# Patient Record
Sex: FEMALE | Race: BLACK OR AFRICAN AMERICAN | Employment: UNEMPLOYED | URBAN - METROPOLITAN AREA
[De-identification: names, ages, dates, MRNs, and addresses within clinical notes are randomized per-mention and may not be internally consistent; named-entity substitution may affect disease eponyms.]

---

## 2017-02-20 ENCOUNTER — HOSPITAL ENCOUNTER (EMERGENCY)
Age: 22
Discharge: HOME | End: 2017-02-20
Attending: EMERGENCY MEDICINE
Payer: MEDICARE

## 2017-02-20 VITALS
DIASTOLIC BLOOD PRESSURE: 95 MMHG | SYSTOLIC BLOOD PRESSURE: 135 MMHG | TEMPERATURE: 98.6 F | RESPIRATION RATE: 16 BRPM | HEART RATE: 91 BPM | OXYGEN SATURATION: 99 %

## 2017-02-20 DIAGNOSIS — N61.1 BREAST ABSCESS: Primary | ICD-10-CM

## 2017-02-20 PROCEDURE — G0381 LEV 2 HOSP TYPE B ED VISIT: HCPCS

## 2017-02-20 PROCEDURE — 99282 EMERGENCY DEPT VISIT SF MDM: CPT

## 2017-02-20 RX ORDER — CEPHALEXIN 250 MG/1
500 CAPSULE ORAL ONCE
Status: DISCONTINUED | OUTPATIENT
Start: 2017-02-20 | End: 2017-02-20 | Stop reason: HOSPADM

## 2017-02-20 RX ORDER — IBUPROFEN 800 MG/1
800 TABLET ORAL EVERY 8 HOURS PRN
Qty: 30 TABLET | Refills: 0 | Status: ON HOLD | OUTPATIENT
Start: 2017-02-20 | End: 2018-05-01 | Stop reason: HOSPADM

## 2017-02-20 RX ORDER — SULFAMETHOXAZOLE AND TRIMETHOPRIM 800; 160 MG/1; MG/1
1 TABLET ORAL 2 TIMES DAILY
Qty: 19 TABLET | Refills: 0 | Status: SHIPPED | OUTPATIENT
Start: 2017-02-20 | End: 2017-03-02

## 2017-02-20 RX ORDER — CEPHALEXIN 500 MG/1
500 CAPSULE ORAL 3 TIMES DAILY
Qty: 30 CAPSULE | Refills: 0 | Status: SHIPPED | OUTPATIENT
Start: 2017-02-20 | End: 2017-03-02

## 2017-02-20 RX ORDER — SULFAMETHOXAZOLE AND TRIMETHOPRIM 800; 160 MG/1; MG/1
1 TABLET ORAL ONCE
Status: DISCONTINUED | OUTPATIENT
Start: 2017-02-20 | End: 2017-02-20 | Stop reason: HOSPADM

## 2017-02-20 ASSESSMENT — PAIN DESCRIPTION - PAIN TYPE: TYPE: ACUTE PAIN

## 2017-02-20 ASSESSMENT — PAIN DESCRIPTION - LOCATION: LOCATION: CHEST

## 2017-02-20 ASSESSMENT — PAIN SCALES - GENERAL: PAINLEVEL_OUTOF10: 7

## 2017-02-20 ASSESSMENT — PAIN DESCRIPTION - ORIENTATION: ORIENTATION: MID

## 2018-05-01 ENCOUNTER — HOSPITAL ENCOUNTER (OUTPATIENT)
Age: 23
Discharge: HOME | End: 2018-05-01
Attending: OBSTETRICS & GYNECOLOGY | Admitting: OBSTETRICS & GYNECOLOGY
Payer: MEDICAID

## 2018-05-01 VITALS
TEMPERATURE: 97.9 F | HEART RATE: 98 BPM | DIASTOLIC BLOOD PRESSURE: 67 MMHG | SYSTOLIC BLOOD PRESSURE: 107 MMHG | RESPIRATION RATE: 16 BRPM

## 2018-05-01 DIAGNOSIS — Z33.1 IUP (INTRAUTERINE PREGNANCY), INCIDENTAL: Primary | ICD-10-CM

## 2018-05-01 LAB
-: ABNORMAL
AMORPHOUS: ABNORMAL
BACTERIA: ABNORMAL
BILIRUBIN URINE: NEGATIVE
C TRACH DNA GENITAL QL NAA+PROBE: NEGATIVE
CASTS UA: ABNORMAL /LPF (ref 0–8)
COLOR: YELLOW
COMMENT UA: ABNORMAL
CRYSTALS, UA: ABNORMAL /HPF
EPITHELIAL CELLS UA: ABNORMAL /HPF (ref 0–5)
GLUCOSE URINE: NEGATIVE
KETONES, URINE: NEGATIVE
LEUKOCYTE ESTERASE, URINE: NEGATIVE
MUCUS: ABNORMAL
N. GONORRHOEAE DNA: NEGATIVE
NITRITE, URINE: NEGATIVE
OTHER OBSERVATIONS UA: ABNORMAL
PH UA: 6 (ref 5–8)
PROTEIN UA: NEGATIVE
RBC UA: ABNORMAL /HPF (ref 0–4)
RENAL EPITHELIAL, UA: ABNORMAL /HPF
SPECIFIC GRAVITY UA: 1.02 (ref 1–1.03)
TRICHOMONAS: ABNORMAL
TURBIDITY: ABNORMAL
URINE HGB: NEGATIVE
UROBILINOGEN, URINE: NORMAL
WBC UA: ABNORMAL /HPF (ref 0–5)
YEAST: ABNORMAL

## 2018-05-01 PROCEDURE — 6370000000 HC RX 637 (ALT 250 FOR IP): Performed by: STUDENT IN AN ORGANIZED HEALTH CARE EDUCATION/TRAINING PROGRAM

## 2018-05-01 PROCEDURE — 99235 HOSP IP/OBS SAME DATE MOD 70: CPT | Performed by: OBSTETRICS & GYNECOLOGY

## 2018-05-01 PROCEDURE — 87591 N.GONORRHOEAE DNA AMP PROB: CPT

## 2018-05-01 PROCEDURE — 87480 CANDIDA DNA DIR PROBE: CPT

## 2018-05-01 PROCEDURE — 87086 URINE CULTURE/COLONY COUNT: CPT

## 2018-05-01 PROCEDURE — 87491 CHLMYD TRACH DNA AMP PROBE: CPT

## 2018-05-01 PROCEDURE — 81001 URINALYSIS AUTO W/SCOPE: CPT

## 2018-05-01 PROCEDURE — 99213 OFFICE O/P EST LOW 20 MIN: CPT

## 2018-05-01 PROCEDURE — 87510 GARDNER VAG DNA DIR PROBE: CPT

## 2018-05-01 PROCEDURE — 87660 TRICHOMONAS VAGIN DIR PROBE: CPT

## 2018-05-01 RX ORDER — SODIUM CHLORIDE 0.9 % (FLUSH) 0.9 %
10 SYRINGE (ML) INJECTION EVERY 12 HOURS SCHEDULED
Status: DISCONTINUED | OUTPATIENT
Start: 2018-05-01 | End: 2018-05-01 | Stop reason: HOSPADM

## 2018-05-01 RX ORDER — SODIUM CHLORIDE 0.9 % (FLUSH) 0.9 %
10 SYRINGE (ML) INJECTION PRN
Status: DISCONTINUED | OUTPATIENT
Start: 2018-05-01 | End: 2018-05-01 | Stop reason: HOSPADM

## 2018-05-01 RX ORDER — CALCIUM CARBONATE 200(500)MG
1000 TABLET,CHEWABLE ORAL 3 TIMES DAILY PRN
Qty: 60 TABLET | Refills: 1 | Status: SHIPPED | OUTPATIENT
Start: 2018-05-01 | End: 2018-05-31

## 2018-05-01 RX ORDER — CALCIUM CARBONATE 200(500)MG
1000 TABLET,CHEWABLE ORAL 3 TIMES DAILY PRN
Status: DISCONTINUED | OUTPATIENT
Start: 2018-05-01 | End: 2018-05-01 | Stop reason: HOSPADM

## 2018-05-01 RX ORDER — ACETAMINOPHEN 500 MG
1000 TABLET ORAL EVERY 6 HOURS PRN
Status: DISCONTINUED | OUTPATIENT
Start: 2018-05-01 | End: 2018-05-01 | Stop reason: HOSPADM

## 2018-05-01 RX ADMIN — ANTACID TABLETS 1000 MG: 500 TABLET, CHEWABLE ORAL at 06:16

## 2018-05-01 RX ADMIN — ACETAMINOPHEN 1000 MG: 500 TABLET ORAL at 06:18

## 2018-05-01 ASSESSMENT — PAIN SCALES - GENERAL: PAINLEVEL_OUTOF10: 8

## 2018-05-02 LAB
CULTURE: NORMAL
CULTURE: NORMAL
Lab: NORMAL
SPECIMEN DESCRIPTION: NORMAL
STATUS: NORMAL

## 2018-05-03 LAB
DIRECT EXAM: NORMAL
DIRECT EXAM: NORMAL
Lab: NORMAL
SPECIMEN DESCRIPTION: NORMAL
STATUS: NORMAL

## 2018-05-21 ENCOUNTER — HOSPITAL ENCOUNTER (OUTPATIENT)
Age: 23
Setting detail: SPECIMEN
Discharge: HOME | End: 2018-05-21
Payer: MEDICAID

## 2018-07-10 ENCOUNTER — HOSPITAL ENCOUNTER (OUTPATIENT)
Age: 23
Discharge: HOME | End: 2018-07-10
Attending: OBSTETRICS & GYNECOLOGY | Admitting: OBSTETRICS & GYNECOLOGY
Payer: MEDICARE

## 2018-07-10 VITALS
SYSTOLIC BLOOD PRESSURE: 132 MMHG | DIASTOLIC BLOOD PRESSURE: 71 MMHG | RESPIRATION RATE: 18 BRPM | HEART RATE: 81 BPM | TEMPERATURE: 98 F

## 2018-07-10 PROBLEM — O09.30 LATE PRENATAL CARE: Status: ACTIVE | Noted: 2018-07-10

## 2018-07-10 PROBLEM — O09.93 HRP (HIGH RISK PREGNANCY), THIRD TRIMESTER: Status: ACTIVE | Noted: 2018-07-10

## 2018-07-10 PROBLEM — O99.119 GESTATIONAL THROMBOCYTOPENIA (HCC): Status: ACTIVE | Noted: 2018-07-10

## 2018-07-10 PROBLEM — O99.019 ANEMIA AFFECTING PREGNANCY: Status: ACTIVE | Noted: 2018-07-10

## 2018-07-10 PROBLEM — E66.9 OBESITY: Status: ACTIVE | Noted: 2018-07-10

## 2018-07-10 PROBLEM — Z72.0 TOBACCO USE: Status: ACTIVE | Noted: 2018-07-10

## 2018-07-10 PROBLEM — N76.0 BACTERIAL VAGINOSIS: Status: ACTIVE | Noted: 2018-07-10

## 2018-07-10 PROBLEM — Z86.32 H/O GESTATIONAL DIABETES IN PRIOR PREGNANCY, CURRENTLY PREGNANT: Status: ACTIVE | Noted: 2018-07-10

## 2018-07-10 PROBLEM — O24.419 DM (DIABETES MELLITUS), GESTATIONAL: Status: ACTIVE | Noted: 2018-07-10

## 2018-07-10 PROBLEM — D69.6 GESTATIONAL THROMBOCYTOPENIA (HCC): Status: ACTIVE | Noted: 2018-07-10

## 2018-07-10 PROBLEM — F41.9 ANXIETY: Status: ACTIVE | Noted: 2018-07-10

## 2018-07-10 PROBLEM — B96.89 BACTERIAL VAGINOSIS: Status: ACTIVE | Noted: 2018-07-10

## 2018-07-10 PROBLEM — O09.299 H/O GESTATIONAL DIABETES IN PRIOR PREGNANCY, CURRENTLY PREGNANT: Status: ACTIVE | Noted: 2018-07-10

## 2018-07-10 PROBLEM — F12.10 TETRAHYDROCANNABINOL (THC) USE DISORDER, MILD, ABUSE: Status: ACTIVE | Noted: 2018-07-10

## 2018-07-10 PROBLEM — Z33.1 IUP (INTRAUTERINE PREGNANCY), INCIDENTAL: Status: RESOLVED | Noted: 2018-05-01 | Resolved: 2018-07-10

## 2018-07-10 LAB
DIRECT EXAM: ABNORMAL
GLUCOSE BLD-MCNC: 91 MG/DL (ref 65–105)
Lab: ABNORMAL
SPECIMEN DESCRIPTION: ABNORMAL
STATUS: ABNORMAL

## 2018-07-10 PROCEDURE — 87591 N.GONORRHOEAE DNA AMP PROB: CPT

## 2018-07-10 PROCEDURE — 82947 ASSAY GLUCOSE BLOOD QUANT: CPT

## 2018-07-10 PROCEDURE — 87510 GARDNER VAG DNA DIR PROBE: CPT

## 2018-07-10 PROCEDURE — 99213 OFFICE O/P EST LOW 20 MIN: CPT

## 2018-07-10 PROCEDURE — 87491 CHLMYD TRACH DNA AMP PROBE: CPT

## 2018-07-10 PROCEDURE — 87660 TRICHOMONAS VAGIN DIR PROBE: CPT

## 2018-07-10 PROCEDURE — 87480 CANDIDA DNA DIR PROBE: CPT

## 2018-07-10 PROCEDURE — 99234 HOSP IP/OBS SM DT SF/LOW 45: CPT | Performed by: OBSTETRICS & GYNECOLOGY

## 2018-07-10 RX ORDER — ACETAMINOPHEN 500 MG
1000 TABLET ORAL EVERY 6 HOURS PRN
Status: DISCONTINUED | OUTPATIENT
Start: 2018-07-10 | End: 2018-07-10 | Stop reason: HOSPADM

## 2018-07-10 RX ORDER — METRONIDAZOLE 500 MG/1
500 TABLET ORAL 2 TIMES DAILY
Qty: 14 TABLET | Refills: 0 | Status: SHIPPED | OUTPATIENT
Start: 2018-07-10 | End: 2018-07-17

## 2018-07-10 RX ORDER — ACETAMINOPHEN 500 MG
1000 TABLET ORAL EVERY 6 HOURS PRN
Status: DISCONTINUED | OUTPATIENT
Start: 2018-07-10 | End: 2018-07-10

## 2018-07-10 NOTE — H&P
88 81   Resp: 17 18   Temp: 98 °F (36.7 °C)        PHYSICAL EXAM:  Fetal Heart Monitor:  Baseline Heart Rate 150, moderate variability, present accelerations, absent decelerations  [place]: contractions, uterine irritability     General appearance:  no apparent distress, alert and cooperative  Neurologic:  alert, oriented, normal speech, no focal findings or movement disorder noted  Lungs:  No increased work of breathing, good air exchange, clear to auscultation bilaterally, no crackles or wheezing  Heart:  regular rate and rhythm and no murmur    Abdomen:  soft, gravid, non-tender, no right upper quadrant tenderness, no CVA tenderness, uterus non-tender, no signs of abruption and no signs of chorioamnionitis  Extremities:  no calf tenderness, non edematous, DTRs: normal    Pelvic Exam:   -Speculum: Normal appearing external genitalia, vulva and vagina without edema, erythema or masses, no  gross blood, cervix visually closed with thickness, white discharge in vault    Cervix Check: 3 cm dilated, 50 % effaced, -2 out of 3 station, mid position, soft consistency, Cephalic    PRENATAL LAB RESULTS:   Blood Type/Rh: O pos  Antibody Screen: negative  Hemoglobin, Hematocrit, Platelets: 9.5 / 37.9 / 141  Rubella: immune  T.  Pallidum, IgG: non-reactive   Hepatitis B Surface Antigen: negative   HIV: non-reactive   Sickle Cell Screen: not available  Gonorrhea: negative  Chlamydia: negative  Urine culture: negative, date: 18    3 hour Glucose Tolerance Test: Fastin; 1 hour: 171; 2 hour  166; 3 hour: 121    Group B Strep: not done  Cystic Fibrosis Screen: not available  First Trimester Screen: not available  MSAFP/Multiple Markers: not available  Non-Invasive Prenatal Testing: not available  Anatomy US: posterior, 3VC, male with no anomalies     ASSESSMENT & PLAN:  Kerry Chan is a 20 y.o. female [id number] at 31w5d IUP   - GBS unknown / Rh positive / R immune   - No indication for GBS prophylaxis unless delivery is answered.     Attending's Name: Dr. Chandler Negro DO  Ob/Gyn Resident  7/10/2018, 1:47 PM

## 2018-07-10 NOTE — FLOWSHEET NOTE
Arrives to unit per squad states I have been joe since 0200, bow intact, no bloody show, baby active, efm applied, fht's 140, abd soft

## 2018-07-11 LAB
C TRACH DNA GENITAL QL NAA+PROBE: NEGATIVE
N. GONORRHOEAE DNA: NEGATIVE

## 2018-09-05 ENCOUNTER — HOSPITAL ENCOUNTER (EMERGENCY)
Age: 23
Discharge: HOME | End: 2018-09-05
Attending: EMERGENCY MEDICINE
Payer: MEDICARE

## 2018-09-05 VITALS
HEART RATE: 95 BPM | HEIGHT: 67 IN | BODY MASS INDEX: 37.67 KG/M2 | SYSTOLIC BLOOD PRESSURE: 134 MMHG | OXYGEN SATURATION: 100 % | RESPIRATION RATE: 18 BRPM | WEIGHT: 240 LBS | TEMPERATURE: 97.9 F | DIASTOLIC BLOOD PRESSURE: 80 MMHG

## 2018-09-05 DIAGNOSIS — N93.9 VAGINAL BLEEDING: Primary | ICD-10-CM

## 2018-09-05 LAB
-: NORMAL
DIRECT EXAM: ABNORMAL
HCG QUALITATIVE: NEGATIVE
HCT VFR BLD CALC: 38.6 % (ref 36.3–47.1)
HEMOGLOBIN: 12.1 G/DL (ref 11.9–15.1)
Lab: ABNORMAL
MCH RBC QN AUTO: 27.7 PG (ref 25.2–33.5)
MCHC RBC AUTO-ENTMCNC: 31.3 G/DL (ref 28.4–34.8)
MCV RBC AUTO: 88.3 FL (ref 82.6–102.9)
NRBC AUTOMATED: 0 PER 100 WBC
PDW BLD-RTO: 15.7 % (ref 11.8–14.4)
PLATELET # BLD: 187 K/UL (ref 138–453)
PMV BLD AUTO: 13 FL (ref 8.1–13.5)
RBC # BLD: 4.37 M/UL (ref 3.95–5.11)
REASON FOR REJECTION: NORMAL
SPECIMEN DESCRIPTION: ABNORMAL
STATUS: ABNORMAL
WBC # BLD: 7.1 K/UL (ref 3.5–11.3)
ZZ NTE CLEAN UP: ORDERED TEST: NORMAL
ZZ NTE WITH NAME CLEAN UP: SPECIMEN SOURCE: NORMAL

## 2018-09-05 PROCEDURE — 87660 TRICHOMONAS VAGIN DIR PROBE: CPT

## 2018-09-05 PROCEDURE — 85027 COMPLETE CBC AUTOMATED: CPT

## 2018-09-05 PROCEDURE — 99284 EMERGENCY DEPT VISIT MOD MDM: CPT

## 2018-09-05 PROCEDURE — 87480 CANDIDA DNA DIR PROBE: CPT

## 2018-09-05 PROCEDURE — 87510 GARDNER VAG DNA DIR PROBE: CPT

## 2018-09-05 PROCEDURE — 84703 CHORIONIC GONADOTROPIN ASSAY: CPT

## 2018-09-05 ASSESSMENT — PAIN DESCRIPTION - ONSET: ONSET: PROGRESSIVE

## 2018-09-05 ASSESSMENT — ENCOUNTER SYMPTOMS
NAUSEA: 0
SHORTNESS OF BREATH: 0
VOMITING: 0
COUGH: 0
DIARRHEA: 0
ABDOMINAL DISTENTION: 0
CONSTIPATION: 0
WHEEZING: 0
SORE THROAT: 0
RHINORRHEA: 0

## 2018-09-05 ASSESSMENT — PAIN DESCRIPTION - FREQUENCY: FREQUENCY: CONTINUOUS

## 2018-09-05 ASSESSMENT — PAIN DESCRIPTION - ORIENTATION: ORIENTATION: LOWER

## 2018-09-05 ASSESSMENT — PAIN DESCRIPTION - PROGRESSION: CLINICAL_PROGRESSION: GRADUALLY WORSENING

## 2018-09-05 ASSESSMENT — PAIN DESCRIPTION - PAIN TYPE: TYPE: ACUTE PAIN

## 2018-09-05 ASSESSMENT — PAIN DESCRIPTION - LOCATION: LOCATION: ABDOMEN

## 2018-09-05 ASSESSMENT — PAIN DESCRIPTION - DESCRIPTORS: DESCRIPTORS: CRAMPING

## 2018-09-05 ASSESSMENT — PAIN SCALES - GENERAL: PAINLEVEL_OUTOF10: 5

## 2018-09-05 NOTE — ED PROVIDER NOTES
[facility]  ED  Emergency Department        Pt Name: Jayden Bryan  MRN: [id number]  [place] 1995  Date of evaluation: 9/5/18    CHIEF COMPLAINT       Chief Complaint   Patient presents with    Vaginal Bleeding       HISTORY OF PRESENT ILLNESS  (Location/Symptom, Timing/Onset, Context/Setting, Quality, Duration, Modifying Factors, Severity.)      Jayden Bryan is a 20 y.o. female who presents with Heavy vaginal bleeding that started yesterday. Patient is approximately 4 weeks postpartum. She's had 6 total 5 births. She was induced at 37 weeks due to pregnancy-induced hypertension, she's been compliant with her 60 mg Procardia twice a day. She denies any abdominal pain. Does state that she had some bleeding after her delivery, which stopped, and then started for the first time yesterday. She has not inserted anything in her vagina, no sexual intercourse. Does state that yesterday she went through 5. Pads in 8 hours while at work. Patient without nausea or vomiting, she is not breast-feeding. No diarrhea or constipation, no dysuria or hematuria. She is not on any anticoagulation. PAST MEDICAL / SURGICAL / SOCIAL / FAMILY HISTORY      has a past medical history of Anemia; Gestational diabetes; and Hypertension. has no past surgical history on file. Social History     Social History    Marital status: Single     Spouse name: N/A    Number of children: N/A    Years of education: N/A     Occupational History    Not on file. Social History Main Topics    Smoking status: Current Every Day Smoker     Packs/day: 0.50     Years: 10.00    Smokeless tobacco: Never Used      Comment: i can quit when i want to    Alcohol use No    Drug use: No    Sexual activity: Yes     Partners: Male     Other Topics Concern    Not on file     Social History Narrative    No narrative on file       History reviewed. No pertinent family history.     Allergies:  Patient has no known allergies. Home Medications:  Prior to Admission medications    Not on File       REVIEW OF SYSTEMS    (2-9 systems for level 4, 10 or more for level 5)      Review of Systems   Constitutional: Negative for activity change, appetite change, fatigue and fever. HENT: Negative for congestion, rhinorrhea and sore throat. Respiratory: Negative for cough, shortness of breath and wheezing. Cardiovascular: Negative for chest pain, palpitations and leg swelling. Gastrointestinal: Negative for abdominal distention, constipation, diarrhea, nausea and vomiting. Genitourinary: Positive for decreased urine volume. Negative for dysuria. Skin: Negative for rash. Neurological: Negative for dizziness, weakness, light-headedness, numbness and headaches. PHYSICAL EXAM   (up to 7 for level 4, 8 or more for level 5)      INITIAL VITALS:   BP (!) 154/94   Pulse 95   Temp 97.9 °F (36.6 °C) (Oral)   Resp 18   Ht 5' 7\" (1.702 m)   Wt 240 lb (108.9 kg)   LMP 11/30/2017   SpO2 99%   BMI 37.59 kg/m²     Physical Exam   Constitutional: She is oriented to person, place, and time. Nontoxic appearing, answering all questions appropriately no signs of distress   HENT:   Head: Normocephalic and atraumatic. Eyes: Conjunctivae are normal. Right eye exhibits no discharge. Left eye exhibits no discharge. Cardiovascular: Normal rate, regular rhythm and normal heart sounds. Exam reveals no gallop and no friction rub. No murmur heard. Pulmonary/Chest: Effort normal and breath sounds normal. No respiratory distress. She has no wheezes. She has no rales. She exhibits no tenderness. Abdominal: Soft. She exhibits no distension and no mass. There is no tenderness. There is no rebound and no guarding.    Genitourinary:   Genitourinary Comments: Exam performed with Joellen Davila RN present in room, Multiparous cervical os, no CMT, blood noted in the vaginal vault, and bleeding from cervical os, no adnexal ttp an appointment as soon as possible for a visit in 2 days        DISCHARGE MEDICATIONS:  New Prescriptions    No medications on file       Jeane Gram  12:16 PM    Attending Emergency Physician  [facility] ED    (Please note that portions of this note were completed with a voice recognition program.  Efforts were made to edit the dictations but occasionally words are mis-transcribed.)              Aiyana Muir DO  09/05/18 2461

## 2019-05-29 ENCOUNTER — HOSPITAL ENCOUNTER (EMERGENCY)
Age: 24
Discharge: HOME | End: 2019-05-30
Attending: EMERGENCY MEDICINE
Payer: MEDICARE

## 2019-05-29 ENCOUNTER — APPOINTMENT (OUTPATIENT)
Dept: CT IMAGING | Age: 24
End: 2019-05-29
Payer: MEDICARE

## 2019-05-29 VITALS
DIASTOLIC BLOOD PRESSURE: 61 MMHG | TEMPERATURE: 98.1 F | RESPIRATION RATE: 16 BRPM | HEIGHT: 64 IN | BODY MASS INDEX: 40.63 KG/M2 | SYSTOLIC BLOOD PRESSURE: 137 MMHG | OXYGEN SATURATION: 100 % | WEIGHT: 238 LBS | HEART RATE: 94 BPM

## 2019-05-29 DIAGNOSIS — R55 SYNCOPE AND COLLAPSE: ICD-10-CM

## 2019-05-29 DIAGNOSIS — R42 DIZZINESS: Primary | ICD-10-CM

## 2019-05-29 PROCEDURE — 80053 COMPREHEN METABOLIC PANEL: CPT

## 2019-05-29 PROCEDURE — 93005 ELECTROCARDIOGRAM TRACING: CPT

## 2019-05-29 PROCEDURE — 84484 ASSAY OF TROPONIN QUANT: CPT

## 2019-05-29 PROCEDURE — 85025 COMPLETE CBC W/AUTO DIFF WBC: CPT

## 2019-05-29 PROCEDURE — 86900 BLOOD TYPING SEROLOGIC ABO: CPT

## 2019-05-29 PROCEDURE — 99284 EMERGENCY DEPT VISIT MOD MDM: CPT

## 2019-05-29 PROCEDURE — 72125 CT NECK SPINE W/O DYE: CPT

## 2019-05-29 PROCEDURE — 70450 CT HEAD/BRAIN W/O DYE: CPT

## 2019-05-29 PROCEDURE — 85055 RETICULATED PLATELET ASSAY: CPT

## 2019-05-29 PROCEDURE — 93005 ELECTROCARDIOGRAM TRACING: CPT | Performed by: EMERGENCY MEDICINE

## 2019-05-29 PROCEDURE — 85379 FIBRIN DEGRADATION QUANT: CPT

## 2019-05-29 PROCEDURE — 86901 BLOOD TYPING SEROLOGIC RH(D): CPT

## 2019-05-29 ASSESSMENT — PAIN DESCRIPTION - PAIN TYPE: TYPE: ACUTE PAIN

## 2019-05-29 ASSESSMENT — PAIN SCALES - GENERAL: PAINLEVEL_OUTOF10: 10

## 2019-05-29 ASSESSMENT — PAIN DESCRIPTION - ORIENTATION: ORIENTATION: POSTERIOR

## 2019-05-29 ASSESSMENT — PAIN DESCRIPTION - LOCATION: LOCATION: BACK;NECK

## 2019-05-30 ENCOUNTER — APPOINTMENT (OUTPATIENT)
Dept: CT IMAGING | Age: 24
End: 2019-05-30
Payer: MEDICARE

## 2019-05-30 ENCOUNTER — HOSPITAL ENCOUNTER (OUTPATIENT)
Age: 24
Discharge: HOME | End: 2019-05-30
Attending: OBSTETRICS & GYNECOLOGY | Admitting: OBSTETRICS & GYNECOLOGY
Payer: MEDICARE

## 2019-05-30 VITALS
SYSTOLIC BLOOD PRESSURE: 121 MMHG | DIASTOLIC BLOOD PRESSURE: 47 MMHG | HEART RATE: 88 BPM | RESPIRATION RATE: 18 BRPM | TEMPERATURE: 97.7 F

## 2019-05-30 PROBLEM — O09.90 HIGH RISK PREGNANCY, ANTEPARTUM: Status: ACTIVE | Noted: 2019-05-30

## 2019-05-30 LAB
ABO/RH: NORMAL
ABSOLUTE EOS #: 0.2 K/UL (ref 0–0.44)
ABSOLUTE IMMATURE GRANULOCYTE: 0.04 K/UL (ref 0–0.3)
ABSOLUTE LYMPH #: 2.83 K/UL (ref 1.1–3.7)
ABSOLUTE MONO #: 0.37 K/UL (ref 0.1–1.2)
ALBUMIN SERPL-MCNC: 3.8 G/DL (ref 3.5–5.2)
ALBUMIN/GLOBULIN RATIO: 1.2 (ref 1–2.5)
ALP BLD-CCNC: 50 U/L (ref 35–104)
ALT SERPL-CCNC: 9 U/L (ref 5–33)
ANION GAP SERPL CALCULATED.3IONS-SCNC: 14 MMOL/L (ref 9–17)
AST SERPL-CCNC: 11 U/L
BASOPHILS # BLD: 0 % (ref 0–2)
BASOPHILS ABSOLUTE: <0.03 K/UL (ref 0–0.2)
BILIRUB SERPL-MCNC: <0.1 MG/DL (ref 0.3–1.2)
BUN BLDV-MCNC: 6 MG/DL (ref 6–20)
BUN/CREAT BLD: ABNORMAL (ref 9–20)
CALCIUM SERPL-MCNC: 9.3 MG/DL (ref 8.6–10.4)
CHLORIDE BLD-SCNC: 101 MMOL/L (ref 98–107)
CO2: 20 MMOL/L (ref 20–31)
CREAT SERPL-MCNC: 0.43 MG/DL (ref 0.5–0.9)
D-DIMER QUANTITATIVE: 1.46 MG/L FEU
DIFFERENTIAL TYPE: ABNORMAL
EOSINOPHILS RELATIVE PERCENT: 2 % (ref 1–4)
GFR AFRICAN AMERICAN: >60 ML/MIN
GFR NON-AFRICAN AMERICAN: >60 ML/MIN
GFR SERPL CREATININE-BSD FRML MDRD: ABNORMAL ML/MIN/{1.73_M2}
GFR SERPL CREATININE-BSD FRML MDRD: ABNORMAL ML/MIN/{1.73_M2}
GLUCOSE BLD-MCNC: 114 MG/DL (ref 70–99)
HCT VFR BLD CALC: 34.1 % (ref 36.3–47.1)
HEMOGLOBIN: 10.5 G/DL (ref 11.9–15.1)
IMMATURE GRANULOCYTES: 1 %
LYMPHOCYTES # BLD: 33 % (ref 24–43)
MCH RBC QN AUTO: 27.8 PG (ref 25.2–33.5)
MCHC RBC AUTO-ENTMCNC: 30.8 G/DL (ref 28.4–34.8)
MCV RBC AUTO: 90.2 FL (ref 82.6–102.9)
MONOCYTES # BLD: 4 % (ref 3–12)
NRBC AUTOMATED: 0 PER 100 WBC
PDW BLD-RTO: 14.2 % (ref 11.8–14.4)
PLATELET # BLD: ABNORMAL K/UL (ref 138–453)
PLATELET ESTIMATE: ABNORMAL
PLATELET, FLUORESCENCE: 172 K/UL (ref 138–453)
PLATELET, IMMATURE FRACTION: 12.7 % (ref 1.1–10.3)
PMV BLD AUTO: ABNORMAL FL (ref 8.1–13.5)
POTASSIUM SERPL-SCNC: 3.7 MMOL/L (ref 3.7–5.3)
RBC # BLD: 3.78 M/UL (ref 3.95–5.11)
RBC # BLD: ABNORMAL 10*6/UL
SEG NEUTROPHILS: 60 % (ref 36–65)
SEGMENTED NEUTROPHILS ABSOLUTE COUNT: 5.08 K/UL (ref 1.5–8.1)
SODIUM BLD-SCNC: 135 MMOL/L (ref 135–144)
TOTAL PROTEIN: 7.1 G/DL (ref 6.4–8.3)
TROPONIN INTERP: NORMAL
TROPONIN T: NORMAL NG/ML
TROPONIN, HIGH SENSITIVITY: <6 NG/L (ref 0–14)
WBC # BLD: 8.5 K/UL (ref 3.5–11.3)
WBC # BLD: ABNORMAL 10*3/UL

## 2019-05-30 PROCEDURE — 99213 OFFICE O/P EST LOW 20 MIN: CPT

## 2019-05-30 PROCEDURE — 6360000004 HC RX CONTRAST MEDICATION: Performed by: EMERGENCY MEDICINE

## 2019-05-30 PROCEDURE — 99219 PR INITIAL OBSERVATION CARE/DAY 50 MINUTES: CPT | Performed by: OBSTETRICS & GYNECOLOGY

## 2019-05-30 PROCEDURE — 71260 CT THORAX DX C+: CPT

## 2019-05-30 RX ORDER — ACETAMINOPHEN 500 MG
1000 TABLET ORAL EVERY 6 HOURS PRN
Status: DISCONTINUED | OUTPATIENT
Start: 2019-05-30 | End: 2019-05-30 | Stop reason: HOSPADM

## 2019-05-30 RX ADMIN — IOHEXOL 75 ML: 350 INJECTION, SOLUTION INTRAVENOUS at 01:10

## 2019-05-30 ASSESSMENT — ENCOUNTER SYMPTOMS
VOMITING: 0
CHEST TIGHTNESS: 0
NAUSEA: 0
SHORTNESS OF BREATH: 0
DIARRHEA: 0
COUGH: 0
ABDOMINAL PAIN: 0
BACK PAIN: 0

## 2019-05-30 NOTE — ED NOTES
Pt. Resting on stretcher, NAD, breathing unlabored, RR16, 02 sat 100% on room air  Full cardiac monitor on  Pt.  Denies all needs at this time  Will continue to monitor and reassess     Jase Mathews RN  05/30/19 0020

## 2019-05-30 NOTE — ED PROVIDER NOTES
Interpretation    Interpreted by me sinus tachycardia, heart rate 101, normal axis, no ST elevation QT corrected 440, diffuse T-wave inversion noted      CRITICAL CARE: There was a high probability of clinically significant/life threatening deterioration in this patient's condition which required my urgent intervention. Total critical care time was 0  minutes. This excludes any time for separately reportable procedures.        [address] [place], DO  05/29/19 Mehul Garcia 1137, DO  05/30/19 Marciano Barone

## 2019-05-30 NOTE — ED NOTES
Pt to room 21 via wheelchair from triage  Pt. Presents with neck pain, back pain and a syncopal episode this evening in which she fell down 3-4 steps  Pt.  Is 4mos pregnant  Denies vaginal discharge/bleeding  EKG obtained, IV access established, labs drawn, full cardiac monitor on  Awaiting orders, will continue to monitor and reassess     Danielle Rhoades RN  05/30/19 0008

## 2019-05-30 NOTE — DISCHARGE INSTR - COC
Continuity of Care Form    Patient Name: Mary Mares   :  1995  MRN:  [id number]    Admit date:  2019  Discharge date:  ***    Code Status Order: Prior   Advance Directives:     Admitting Physician:  No admitting provider for patient encounter. PCP: No primary care provider on file. Discharging Nurse: [facility] Unit/Room#:   Discharging Unit Phone Number: ***    Emergency Contact:   Extended Emergency Contact Information  Primary Emergency Contact: Alexi Rosales  Address: [place] [address]  Home Phone: [phone]  Relation: Parent    Past Surgical History:  History reviewed. No pertinent surgical history. Immunization History: There is no immunization history on file for this patient.     Active Problems:  Patient Active Problem List   Diagnosis Code    Breast abscess N61.1    Abdominal pain in pregnancy, second trimester O26.892, R10.9    Insufficient PNC O09.32    Desires Tubal O09.93    Anemia affecting pregnancy O99.019    Tetrahydrocannabinol (THC) use disorder, mild, abuse F12.10    DM (diabetes mellitus), gestational O23.80    Obesity E66.9    Tobacco use Z72.0    Late prenatal care O09.30    Gestational thrombocytopenia (HCC) O99.119, D77.5    H/O gestational diabetes in prior pregnancy, currently pregnant O09.299, Z80.34    Anxiety F41.9    Bacterial vaginosis N76.0, B96.89     contractions O47.9       Isolation/Infection:   Isolation          No Isolation            Nurse Assessment:  Last Vital Signs: /61   Pulse 94   Temp 98.1 °F (36.7 °C) (Oral)   Resp 16   Ht 5' 4\" (1.626 m)   Wt 238 lb (108 kg)   LMP 2018 (Exact Date)   SpO2 100%   BMI 40.85 kg/m²     Last documented pain score (0-10 scale): Pain Level: 10  Last Weight:   Wt Readings from Last 1 Encounters:   19 238 lb (108 kg)     Mental Status:  {IP PT MENTAL STATUS:80054}    IV Access:  { NICOLASA IV ACCESS:897767487}    Nursing Mobility/ADLs:  Walking   {CHP DME HPWP:713939957}  Transfer  {CHP DME ZDVN:272435695}  Bathing  {CHP DME CRNJ:044724608}  Dressing  {CHP DME FMHF:050942517}  Toileting  {CHP DME JFDI:744153358}  Feeding  {CHP DME ZQRN:885350972}  Med Admin  {CHP DME YKNS:911902609}  Med Delivery   { NICOLASA MED Delivery:578595154}    Wound Care Documentation and Therapy:        Elimination:  Continence:   · Bowel: {YES / HS:76634}  · Bladder: {YES / OV:89036}  Urinary Catheter: {Urinary Catheter:088185357}   Colostomy/Ileostomy/Ileal Conduit: {YES / FQ:20043}       Date of Last BM: ***  No intake or output data in the 24 hours ending 19 0223  No intake/output data recorded.     Safety Concerns:     [address] Safety Concerns:581240176}    Impairments/Disabilities:      [address] Impairments/Disabilities:616364009}    Nutrition Therapy:  Current Nutrition Therapy:   [address] Diet List:309468686}    Routes of Feeding: {[facility] DME Other Feedings:596483969}  Liquids: {Slp liquid thickness:35144}  Daily Fluid Restriction: {CHP DME Yes amt example:691298485}  Last Modified Barium Swallow with Video (Video Swallowing Test): {Done Not Done DCXT:464017183}    Treatments at the Time of Hospital Discharge:   Respiratory Treatments: ***  Oxygen Therapy:  {Therapy; copd oxygen:99976}  Ventilator:    { CC Vent NR:752952911}    Rehab Therapies: {THERAPEUTIC INTERVENTION:4412550079}  Weight Bearing Status/Restrictions: [address] wireWAX Weight Bearin}  Other Medical Equipment (for information only, NOT a DME order):  {EQUIPMENT:809720709}  Other Treatments: ***    Patient's personal belongings (please select all that are sent with patient):  {[facility] DME Belongings:530176407}    RN SIGNATURE:  {Esignature:922020718}    CASE MANAGEMENT/SOCIAL WORK SECTION    Inpatient Status Date: ***    Readmission Risk Assessment Score:  Readmission Risk              Risk of Unplanned Readmission:        0           Discharging to Facility/ Agency   · Name:

## 2019-05-30 NOTE — H&P
currently pregnant    Anxiety    Bacterial vaginosis     contractions    High risk pregnancy, antepartum        Steroids Given In This Pregnancy:  no     REVIEW OF SYSTEMS:   Constitutional: negative fever, negative chills  HEENT: negative visual disturbances, negative headaches  Respiratory: negative dyspnea, negative cough  Cardiovascular: negative chest pain, positive chest tightness, negative palpitations  Gastrointestinal: negative abdominal pain, negative RUQ pain, negative N/V, negative diarrhea, negative constipation  Genitourinary: negative dysuria, negative vaginal discharge, negative vaginal bleeding  Dermatological: negative rash, negative lesions, negative pruritus  Hematologic: negative bruising  Immunologic/Lymphatic: negative recent illness, negative recent sick contact  Musculoskeletal: negative back pain, negative myalgias, negative arthralgias  Neurological:  pos dizziness, negative weakness, pos syncope  Behavior/Psych: negative depression, negative anxiety      OBSTETRICAL HISTORY:   OB History    Para Term  AB Living   7 5 4 1 1 5   SAB TAB Ectopic Molar Multiple Live Births   0 1 0 0 0 5      # Outcome Date GA Lbr Arnel/2nd Weight Sex Delivery Anes PTL Lv   7 Current            6 Term 17    M Vag-Spont   MARCELINA   5 Term 14    F Vag-Spont   MARCELINA   4  10/29/12    M Vag-Spont   MARCELINA   3 Term 11    M    MARCELINA   2 Term 09    M Vag-Spont   MARCELINA   1 TAB                PAST MEDICAL HISTORY:   has a past medical history of Anemia, Gestational diabetes, and Hypertension. PAST SURGICAL HISTORY:   has no past surgical history on file. ALLERGIES:  has No Known Allergies. MEDICATIONS:  Prior to Admission medications    Not on File       FAMILY HISTORY:  family history is not on file. SOCIAL HISTORY:   reports that she has been smoking. She has a 5.00 pack-year smoking history.  She has never used smokeless tobacco. She reports that she does not drink alcohol or use drugs.     VITALS:  Vitals:    05/30/19 0303   BP: (!) 121/47   Pulse: 88   Resp: 18   Temp: 97.7 °F (36.5 °C)   TempSrc: Oral         PHYSICAL EXAM:  Fetal Heart Tones: 155bpm   General appearance:  no apparent distress, alert and cooperative  Neurologic:  alert, oriented, normal speech, no focal findings or movement disorder noted  Lungs:  No increased work of breathing, good air exchange, clear to auscultation bilaterally, no crackles or wheezing  Heart:  regular rate and rhythm and no murmur    Abdomen:  soft, gravid, non-tender, no right upper quadrant tenderness, no CVA tenderness, uterus non-tender, no signs of abruption and no signs of chorioamnionitis  Extremities:  no calf tenderness, non edematous, DTRs: normal    Pelvic Exam: not indicated       OMM Structural Exam:  Chief Complaint:  Pregnancy    Anterior/ Posterior Spinal Curves: Lumbar Lordosis -  Increased  Scoliosis (Lateral Spinal Curves): None  Assessment Tool:  T= Tenderness, A= Asymmetry, R= Restricted Motion (A=Active, P=Passive), T=Tissue Texture Changes  Region Evaluated : Severity / Specific of Major Somatic Dysfunction  M99.03 Lumbar -  Minor TART - more than BG levels -   Major Correlations with:  Genitourinary  Structural Diagnosis: Increased lumbar lordosis  Treatment Plan: Outpatient       LIMITED BEDSIDE US:  Position: Cephalic  Placental Location: anterior  Fetal Heart Tones: Present  Fetal Movement: Present  Amniotic Fluid Index/Volume:  adequate 2x2 cm fluid pocket  Estimated Fetal Weight:  0 lbs 3oz consistent with GA 15w4d     PRENATAL LAB RESULTS: Not yet done   Blood Type/Rh: O pos  Hemoglobin, Hematocrit, Platelets: 09.4 / 74.8 / 172      ASSESSMENT & PLAN:  Filiberto Smith is a 20 y.o. female E6N3139 at 15w6d IUP   - GBS unknown / Rh positive / R unknown   - No indication for GBS prophylaxis   - LBUS corresponds to GA based on LMP    - FHTs 155bpm    S/P Syncopal Episode, chest tightness    - Afebrile, VSS    - Pt reports symptoms have improved with rest    - ED workup negative including EKG, CT chest, Troponin    - CT head and CT cervical spine negative    - PT denies any injuries or abdominal pain    - Likely 2/2 dehydration/hypotension    - PO hydration encouraged, patient instructed to consume frequent small, high protein content meals     Hx of gHTN    Hx of GDMA2   - Pt will need early 1 hour GTT     Insufficient PNC   - [facility]    - Compliance with prenatal appts and labs encouraged     Obesity    Tobacco use   - Cessation encouraged       Patient Active Problem List    Diagnosis Date Noted    High risk pregnancy, antepartum 2019    Desires Tubal 07/10/2018    Anemia affecting pregnancy 07/10/2018    Tetrahydrocannabinol (THC) use disorder, mild, abuse 07/10/2018    DM (diabetes mellitus), gestational 07/10/2018    Obesity 07/10/2018    Tobacco use 07/10/2018    Late prenatal care 07/10/2018    Gestational thrombocytopenia ([address] 07/10/2018     Last plt count 141      H/O gestational diabetes in prior pregnancy, currently pregnant 07/10/2018    Anxiety 07/10/2018    Bacterial vaginosis 07/10/2018     rx given for flagyl        contractions     Abdominal pain in pregnancy, second trimester     Insufficient PNC     Breast abscess 2017       Plan discussed with Dr. Yamileth Blevins, who is agreeable. Pt is stable for discharge to home. Steroids given this admission: No    Risks, benefits, alternatives and possible complications have been discussed in detail with the patient. Admission, and post admission procedures and expectations were discussed in detail. All questions were answered.     Attending's Name: Dr. Royer Flores DO  Ob/Gyn Resident  2019, 5:04 AM    Date: 6/3/2019  Time: 7:45 PM      Patient Name: Leopold Kobus  Patient : 1995  Room/Bed: [place] [address]  Admission Date/Time: 2019  2:50 AM        Attending Physician Statement  I have discussed the care of Fabiano Aburto, including pertinent history and exam findings with the resident. I have reviewed and edited their note in the electronic medical record. The key elements of all parts of the encounter have been performed/reviewed by me . I agree with the assessment, plan and orders as documented by the resident. The level of care submitted represents to the best of my ability the care documented in the medical record today. GC Modifier. This service has been performed in part by a resident under the direction of a teaching physician.         Attending's Name:  Tato Portillo DO

## 2019-05-30 NOTE — ED PROVIDER NOTES
Current Every Day Smoker     Packs/day: 0.50     Years: 10.00     Pack years: 5.00    Smokeless tobacco: Never Used    Tobacco comment: i can quit when i want to   Substance and Sexual Activity    Alcohol use: No    Drug use: No    Sexual activity: Yes     Partners: Male   Lifestyle    Physical activity:     Days per week: Not on file     Minutes per session: Not on file    Stress: Not on file   Relationships    Social connections:     Talks on phone: Not on file     Gets together: Not on file     Attends Gnosticism service: Not on file     Active member of club or organization: Not on file     Attends meetings of clubs or organizations: Not on file     Relationship status: Not on file    Intimate partner violence:     Fear of current or ex partner: Not on file     Emotionally abused: Not on file     Physically abused: Not on file     Forced sexual activity: Not on file   Other Topics Concern    Not on file   Social History Narrative    Not on file       Patient advised to stop smoking or to avoid tobacco use. History reviewed. No pertinent family history. Allergies:  Patient has no known allergies. HomeMedications:  Prior to Admission medications    Not on File       REVIEW OF SYSTEMS    (2-9 systems for level 4, 10 or more for level 5)      Review of Systems   Constitutional: Negative for chills and fever. Respiratory: Negative for cough, chest tightness and shortness of breath. Cardiovascular: Negative for chest pain and leg swelling. Gastrointestinal: Negative for abdominal pain, diarrhea, nausea and vomiting. Genitourinary: Negative for dysuria and hematuria. Musculoskeletal: Negative for arthralgias and back pain. Skin: Negative for rash and wound. Allergic/Immunologic: Negative for food allergies and immunocompromised state. Neurological: Positive for dizziness and syncope. Negative for weakness and headaches. Psychiatric/Behavioral: Negative for suicidal ideas.  The patient is not nervous/anxious. PHYSICAL EXAM   (up to 7 for level 4, 8or more for level 5)      INITIAL VITALS:   /61   Pulse 94   Temp 98.1 °F (36.7 °C) (Oral)   Resp 16   Ht 5' 4\" (1.626 m)   Wt 238 lb (108 kg)   LMP 11/20/2018 (Exact Date)   SpO2 100%   BMI 40.85 kg/m²     Physical Exam   Constitutional: She is oriented to person, place, and time. She appears well-developed and well-nourished. No distress. HENT:   Head: Normocephalic and atraumatic. Right Ear: External ear normal.   Left Ear: External ear normal.   No signs of trauma on head, no midline cervical spine tenderness   Eyes: Conjunctivae are normal. Right eye exhibits no discharge. Left eye exhibits no discharge. Neck: Normal range of motion. Neck supple. No JVD present. No tracheal deviation present. Cardiovascular: Normal rate, regular rhythm and normal heart sounds. Exam reveals no gallop and no friction rub. No murmur heard. No lower extremity swelling no posterior calf pain   Pulmonary/Chest: Effort normal and breath sounds normal. No stridor. No respiratory distress. She exhibits no tenderness. Abdominal: Soft. Bowel sounds are normal. She exhibits no distension and no mass. There is no tenderness. No right upper quadrant abdominal tenderness   Musculoskeletal: Normal range of motion. She exhibits no edema, tenderness or deformity. Neurological: She is alert and oriented to person, place, and time. Cranial nerves II through XII grossly intact bilaterally. Muscle strength 5 out of 5 in upper and lower extremities bilaterally . Sensation intact to face and extremities, cerebellar testing normal including normal finger-nose-finger and heel-to-shin. DTRs 2+. Gait steady without assistance. Skin: Skin is warm, dry and intact. No rash noted. She is not diaphoretic. Psychiatric: She has a normal mood and affect. Her speech is normal. Judgment normal.   Nursing note and vitals reviewed.       DIFFERENTIAL DIAGNOSIS     PLAN (LABS / IMAGING / EKG):  Orders Placed This Encounter   Procedures    CT HEAD WO CONTRAST    CT CERVICAL SPINE WO CONTRAST    CT Chest Pulmonary Embolism W Contrast    CBC Auto Differential    Comprehensive Metabolic Panel w/ Reflex to MG    Troponin    D-Dimer, Quantitative    Immature Platelet Fraction    Inpatient consult to Obstetrics / Gynecology    EKG 12 Lead    ABO/RH    Insert peripheral IV       MEDICATIONS ORDERED:  Orders Placed This Encounter   Medications    iohexol (OMNIPAQUE 350) solution 75 mL       DIAGNOSTIC RESULTS / EMERGENCY DEPARTMENT COURSE / MDM     LABS:  Results for orders placed or performed during the hospital encounter of 05/29/19   CBC Auto Differential   Result Value Ref Range    WBC 8.5 3.5 - 11.3 k/uL    RBC 3.78 (L) 3.95 - 5.11 m/uL    Hemoglobin 10.5 (L) 11.9 - 15.1 g/dL    Hematocrit 34.1 (L) 36.3 - 47.1 %    MCV 90.2 82.6 - 102.9 fL    MCH 27.8 25.2 - 33.5 pg    MCHC 30.8 28.4 - 34.8 g/dL    RDW 14.2 11.8 - 14.4 %    Platelets See Reflexed IPF Result 138 - 453 k/uL    MPV NOT REPORTED 8.1 - 13.5 fL    NRBC Automated 0.0 0.0 per 100 WBC    Differential Type NOT REPORTED     WBC Morphology NOT REPORTED     RBC Morphology NOT REPORTED     Platelet Estimate NOT REPORTED     Seg Neutrophils 60 36 - 65 %    Lymphocytes 33 24 - 43 %    Monocytes 4 3 - 12 %    Eosinophils % 2 1 - 4 %    Basophils 0 0 - 2 %    Immature Granulocytes 1 (H) 0 %    Segs Absolute 5.08 1.50 - 8.10 k/uL    Absolute Lymph # 2.83 1.10 - 3.70 k/uL    Absolute Mono # 0.37 0.10 - 1.20 k/uL    Absolute Eos # 0.20 0.00 - 0.44 k/uL    Basophils # <0.03 0.00 - 0.20 k/uL    Absolute Immature Granulocyte 0.04 0.00 - 0.30 k/uL   Comprehensive Metabolic Panel w/ Reflex to MG   Result Value Ref Range    Glucose 114 (H) 70 - 99 mg/dL    BUN 6 6 - 20 mg/dL    CREATININE 0.43 (L) 0.50 - 0.90 mg/dL    Bun/Cre Ratio NOT REPORTED 9 - 20    Calcium 9.3 8.6 - 10.4 mg/dL    Sodium 135 135 - 144 mmol/L Potassium 3.7 3.7 - 5.3 mmol/L    Chloride 101 98 - 107 mmol/L    CO2 20 20 - 31 mmol/L    Anion Gap 14 9 - 17 mmol/L    Alkaline Phosphatase 50 35 - 104 U/L    ALT 9 5 - 33 U/L    AST 11 <32 U/L    Total Bilirubin <0.10 (L) 0.3 - 1.2 mg/dL    Total Protein 7.1 6.4 - 8.3 g/dL    Alb 3.8 3.5 - 5.2 g/dL    Albumin/Globulin Ratio 1.2 1.0 - 2.5    GFR Non-African American >60 >60 mL/min    GFR African American >60 >60 mL/min    GFR Comment          GFR Staging NOT REPORTED    Troponin   Result Value Ref Range    Troponin, High Sensitivity <6 0 - 14 ng/L    Troponin T NOT REPORTED <0.03 ng/mL    Troponin Interp NOT REPORTED    D-Dimer, Quantitative   Result Value Ref Range    D-Dimer, Quant 1.46 mg/L FEU   Immature Platelet Fraction   Result Value Ref Range    Platelet, Immature Fraction 12.7 (H) 1.1 - 10.3 %    Platelet, Fluorescence 172 138 - 453 k/uL   ABO/RH   Result Value Ref Range    ABO/Rh O POSITIVE        IMPRESSION: Impression is dizziness, possible anemia. Review of patient's chart shows that she has a history of anemia affecting her pregnancy. She has no conjunctival pallor she has brisk capillary refill and is neuro intact and exhibits no nystagmus. She is not tachycardic or breath sounds are clear and she is not complaining of chest pain. There is no lower extremity swelling. Lower suspicion for PE or DVT, low suspicion for ACS. We'll check basic labs for I abnormalities, anemia ABO Rh, EKG and a bedside ultrasound for fetal heart tones. We'll reevaluate. RADIOLOGY:  Ct Head Wo Contrast    Result Date: 5/30/2019  1. No acute intracranial abnormality. 2. No acute fracture or subluxation of the cervical spine. Ct Cervical Spine Wo Contrast    Result Date: 5/30/2019  1. No acute intracranial abnormality. 2. No acute fracture or subluxation of the cervical spine. Ct Chest Pulmonary Embolism W Contrast    Result Date: 5/30/2019  1. No CT evidence of a pulmonary embolism.  2. No acute abnormality of the thoracic aorta. 3. No acute intrapulmonary findings. EKG  EKG Interpretation    Interpreted by emergency department physician    Rhythm: sinus tachycardia  Rate: 100-110  Axis: normal  Ectopy: none  Conduction: normal  ST Segments: no acute change  T Waves: inversion in  v3, v4, v5, v6 and aVf  Q Waves: none    Clinical Impression: non-specific EKG, T wave inversions are new when compared with previous from 7/16/2016    Nila Gloria      All EKG's are interpreted by the [facility] Physician who either signs or Co-signs this chart in the absence of a cardiologist.    [address] [facility]:  Patient seen and evaluated by myself and attending. ED Course as of May 30 0211   Thu May 30, 2019   0135 Patient's labs showed hemoglobin of 10.5 which is baseline for the patient. Her d-dimer was slightly elevated at 1.4. And her EKG showed sinus tachycardia with T-wave inversions which are new from previous. CT rule out pulmonary embolism is negative for pulmonary embolus. Discussed the case with ObGyn who would like to do a tracing on the patient upstairs. Patient discharged to [facility]. [BW]      ED Course User Index  [BW] Nila Gloria DO     Discussed results and plan with patient and patient isagreeable with plan. Patient is requesting discharge. Patient was given written and verbal instructions prior to discharge. Did ask the patient to return to the emergency department if symptoms continue, worsen, or if the patient has any other concerns. Also asked the patient to make an appointment with PCP as soon as possible. Patient understood and agreed. Patient had no further questions. PROCEDURES:  None [id number]    CONSULTS:  IP CONSULT TO OB GYN      FINAL IMPRESSION      1. Dizziness    2.  Syncope and collapse          DISPOSITION / PLAN     DISPOSITION Decision To Discharge 05/30/2019 01:55:11 AM      PATIENT REFERRED TO:  [facility]  [address] [facility] [address]  [phone]    As needed, If symptoms persist or worsen      DISCHARGE MEDICATIONS:  There are no discharge medications for this patient.       Wilmar Bella DO  Emergency Medicine Resident    (Please note that portions of thisnote were completed with a voice recognition program.  Efforts were made to edit the dictations but occasionally words are mis-transcribed.)     Wilmar Bella DO  05/30/19 0886

## 2019-05-31 LAB
EKG ATRIAL RATE: 101 BPM
EKG P AXIS: 48 DEGREES
EKG P-R INTERVAL: 144 MS
EKG Q-T INTERVAL: 340 MS
EKG QRS DURATION: 88 MS
EKG QTC CALCULATION (BAZETT): 440 MS
EKG R AXIS: 47 DEGREES
EKG T AXIS: 8 DEGREES
EKG VENTRICULAR RATE: 101 BPM

## 2019-05-31 PROCEDURE — 93010 ELECTROCARDIOGRAM REPORT: CPT | Performed by: INTERNAL MEDICINE

## 2019-08-03 ENCOUNTER — HOSPITAL ENCOUNTER (OUTPATIENT)
Age: 24
Discharge: HOME | End: 2019-08-03
Attending: OBSTETRICS & GYNECOLOGY | Admitting: OBSTETRICS & GYNECOLOGY
Payer: MEDICARE

## 2019-08-03 VITALS
HEART RATE: 98 BPM | RESPIRATION RATE: 16 BRPM | SYSTOLIC BLOOD PRESSURE: 122 MMHG | TEMPERATURE: 98.4 F | DIASTOLIC BLOOD PRESSURE: 72 MMHG

## 2019-08-03 PROBLEM — O09.92 HIGH-RISK PREGNANCY, SECOND TRIMESTER: Status: ACTIVE | Noted: 2019-08-03

## 2019-08-03 LAB
BILIRUBIN URINE: NEGATIVE
COLOR: YELLOW
COMMENT UA: NORMAL
DIRECT EXAM: ABNORMAL
GLUCOSE URINE: NEGATIVE
KETONES, URINE: NEGATIVE
LEUKOCYTE ESTERASE, URINE: NEGATIVE
Lab: ABNORMAL
NITRITE, URINE: NEGATIVE
PH UA: 6 (ref 5–8)
PROTEIN UA: NEGATIVE
SPECIFIC GRAVITY UA: 1.01 (ref 1–1.03)
SPECIMEN DESCRIPTION: ABNORMAL
TURBIDITY: CLEAR
URINE HGB: NEGATIVE
UROBILINOGEN, URINE: NORMAL

## 2019-08-03 PROCEDURE — 81003 URINALYSIS AUTO W/O SCOPE: CPT

## 2019-08-03 PROCEDURE — 99223 1ST HOSP IP/OBS HIGH 75: CPT | Performed by: OBSTETRICS & GYNECOLOGY

## 2019-08-03 PROCEDURE — 87480 CANDIDA DNA DIR PROBE: CPT

## 2019-08-03 PROCEDURE — 87591 N.GONORRHOEAE DNA AMP PROB: CPT

## 2019-08-03 PROCEDURE — 87660 TRICHOMONAS VAGIN DIR PROBE: CPT

## 2019-08-03 PROCEDURE — 87510 GARDNER VAG DNA DIR PROBE: CPT

## 2019-08-03 PROCEDURE — 87491 CHLMYD TRACH DNA AMP PROBE: CPT

## 2019-08-03 PROCEDURE — 99213 OFFICE O/P EST LOW 20 MIN: CPT

## 2019-08-03 RX ORDER — METRONIDAZOLE 500 MG/1
500 TABLET ORAL 2 TIMES DAILY
Qty: 14 TABLET | Refills: 0 | Status: SHIPPED | OUTPATIENT
Start: 2019-08-03 | End: 2019-08-10

## 2019-08-03 RX ORDER — ACETAMINOPHEN 500 MG
1000 TABLET ORAL EVERY 6 HOURS PRN
Status: DISCONTINUED | OUTPATIENT
Start: 2019-08-03 | End: 2019-08-03 | Stop reason: HOSPADM

## 2019-08-03 NOTE — H&P
Given In This Pregnancy:  no     REVIEW OF SYSTEMS:   Constitutional: negative fever, negative chills, negative weight changes   HEENT: negative visual disturbances, negative headaches, negative dizziness, negative hearing loss  Breast: Negative breast abnormalities, negative breast lumps, negative nipple discharge  Respiratory: negative dyspnea, negative cough, negative SOB  Cardiovascular: negative chest pain,  negative palpitations, negative arrhythmia, negative syncope   Gastrointestinal: negative abdominal pain, negative RUQ pain, negative N/V, negative diarrhea, negative constipation, negative bowel changes, negative heartburn   Genitourinary: negative dysuria, negative hematuria, negative urinary incontinence, negative vaginal discharge  Dermatological: negative rash, negative pruritis, negative mole or other skin changes  Hematologic: negative bruising  Immunologic/Lymphatic: negative recent illness, negative recent sick contact  Musculoskeletal: bilateral low back pain worse on the right, negative myalgias, negative arthralgias  Neurological:  negative dizziness, negative migraines, negative seizures, negative weakness  Behavior/Psych: negative depression, negative anxiety, negative SI, negative HI      OBSTETRICAL HISTORY:   OB History    Para Term  AB Living   7 5 4 1 1 5   SAB TAB Ectopic Molar Multiple Live Births   0 1 0 0 0 5      # Outcome Date GA Lbr Arnel/2nd Weight Sex Delivery Anes PTL Lv   7 Current            6 Term 17    M Vag-Spont   MARCELINA   5 Term 14    F Vag-Spont   MARCELINA   4  10/29/12    M Vag-Spont   MARCELINA   3 Term 11    M    MARCELINA   2 Term 09    M Vag-Spont   MARCELINA   1 TAB                PAST MEDICAL HISTORY:   has a past medical history of Anemia, Gestational diabetes, and Hypertension. PAST SURGICAL HISTORY:   has no past surgical history on file. ALLERGIES:  has No Known Allergies.     MEDICATIONS:  Prior to Admission medications    Not on File palpated due to body habitus    Speculum: visually closed cervix with slight discharge present at os, no bleeding. Scant white discharge present in vaginal vault. OMM Structural Exam:  Chief Complaint:  Pregnancy    Anterior/ Posterior Spinal Curves: Lumbar Lordosis -  Increased  Scoliosis (Lateral Spinal Curves): None  Assessment Tool:  T= Tenderness, A= Asymmetry, R= Restricted Motion (A=Active, P=Passive), T=Tissue Texture Changes  Region Evaluated : Severity / Specific of Major Somatic Dysfunction  M99.03 Lumbar -  Minor TART - more than BG levels -   Major Correlations with:  Genitourinary  Structural Diagnosis: Increased lumbar lordosis  Treatment Plan: Outpatient     PRENATAL LAB RESULTS:   Blood Type/Rh: O pos  Antibody Screen: negative  Hemoglobin, Hematocrit, Platelets: 87.7 / 88.6 / 135  Rubella: immune  T. Pallidum, IgG: not available   Hepatitis B Surface Antigen: negative   HIV: not available   Sickle Cell Screen: negative  Gonorrhea: negative  Chlamydia: negative  Urine culture: negative, date: 6/10/19    1 hour Glucose Tolerance Test: 156  3 hour Glucose Tolerance Test: Not done yet    Group B Strep: Not available  Cystic Fibrosis Screen: not available  First Trimester Screen: not available  MSAFP/Multiple Markers: not available  Non-Invasive Prenatal Testing: normal  Anatomy US: not available    ASSESSMENT & PLAN:  Tanesha Barry is a 30 y.o. female [id number] at 25w1d IUP presents for back pain   - GBS unknown / Rh positive / R immune   - UA reflex to culture, GC/C, Vaginitis collected   - Tylenol for pain, suspect musculoskeletal in nature. Noticeable tissue tension to the right paraspinal musculature    Hx GDMA   - Elevated 1hr, no 3hr GTT completed yet    Hx gHTN   - Currently taking ASA 81    Tobacco Use   - Encouraged cessation   - Counseled on increased risk of SIDS    Marijuana Use   - Encouraged cessation    Obesity      Patient on the monitor.  Reassuring category 1 tracing with no contractions. Patient offered Tylenol for pain and declined. Patient slept for multiple hours and woke up feeling better. Cervix visually closed on speculum exam. Denying contractions. Cultures collected, positive for BV. Rx for flagyl given. Encouraged close outpatient follow up. Precautions given for  labor. Patient counseled that if symptoms worsen or if she develops contractions, vaginal bleeding, or leaking of fluid to call the office or come back for evaluation. Plan discussed with senior resident. Patient Active Problem List    Diagnosis Date Noted    High-risk pregnancy, second trimester 2019    15 weeks gestation of pregnancy     Syncope     High risk pregnancy, antepartum 2019    Desires Tubal 07/10/2018    Anemia affecting pregnancy 07/10/2018    Tetrahydrocannabinol (THC) use disorder, mild, abuse 07/10/2018    DM (diabetes mellitus), gestational 07/10/2018    Obesity 07/10/2018    Tobacco use 07/10/2018    Late prenatal care 07/10/2018    Gestational thrombocytopenia ([address] 07/10/2018     Last plt count 141      H/O gestational diabetes in prior pregnancy, currently pregnant 07/10/2018    Anxiety 07/10/2018    Bacterial vaginosis 07/10/2018     rx given for flagyl        contractions     Abdominal pain in pregnancy, second trimester     Insufficient PNC     Breast abscess 2017       Plan discussed with Dr. Tato Godinez, who is agreeable. Steroids given this admission: No    Risks, benefits, alternatives and possible complications have been discussed in detail with the patient. Admission, and post admission procedures and expectations were discussed in detail. All questions were answered. Attending's Name: Dr. Radha Stuart,   Ob/Gyn Resident  8/3/2019, 12:58 AM     Resident Physician Statement  I have discussed the case, including pertinent history and exam findings with the above OB/GYN intern. I have personally seen the patient.

## 2019-08-05 LAB
C TRACH DNA GENITAL QL NAA+PROBE: NEGATIVE
N. GONORRHOEAE DNA: NEGATIVE
SPECIMEN DESCRIPTION: NORMAL

## 2022-08-09 ENCOUNTER — APPOINTMENT (OUTPATIENT)
Dept: CT IMAGING | Age: 27
End: 2022-08-09
Payer: MEDICARE

## 2022-08-09 ENCOUNTER — APPOINTMENT (OUTPATIENT)
Dept: GENERAL RADIOLOGY | Age: 27
End: 2022-08-09
Payer: MEDICARE

## 2022-08-09 ENCOUNTER — HOSPITAL ENCOUNTER (EMERGENCY)
Age: 27
Discharge: HOME | End: 2022-08-09
Attending: EMERGENCY MEDICINE
Payer: MEDICARE

## 2022-08-09 VITALS
OXYGEN SATURATION: 97 % | SYSTOLIC BLOOD PRESSURE: 150 MMHG | DIASTOLIC BLOOD PRESSURE: 113 MMHG | TEMPERATURE: 98.1 F | HEART RATE: 82 BPM | RESPIRATION RATE: 25 BRPM

## 2022-08-09 DIAGNOSIS — J40 BRONCHITIS: Primary | ICD-10-CM

## 2022-08-09 LAB
ABSOLUTE EOS #: 1.05 K/UL (ref 0–0.44)
ABSOLUTE IMMATURE GRANULOCYTE: <0.03 K/UL (ref 0–0.3)
ABSOLUTE LYMPH #: 2.77 K/UL (ref 1.1–3.7)
ABSOLUTE MONO #: 0.4 K/UL (ref 0.1–1.2)
ANION GAP SERPL CALCULATED.3IONS-SCNC: 19 MMOL/L (ref 9–17)
BASOPHILS # BLD: 1 % (ref 0–2)
BASOPHILS ABSOLUTE: 0.09 K/UL (ref 0–0.2)
BUN BLDV-MCNC: 3 MG/DL (ref 6–20)
CALCIUM SERPL-MCNC: 9.6 MG/DL (ref 8.6–10.4)
CHLORIDE BLD-SCNC: 105 MMOL/L (ref 98–107)
CO2: 17 MMOL/L (ref 20–31)
CREAT SERPL-MCNC: 0.75 MG/DL (ref 0.5–0.9)
D-DIMER QUANTITATIVE: 0.98 MG/L FEU
EOSINOPHILS RELATIVE PERCENT: 14 % (ref 1–4)
GFR AFRICAN AMERICAN: >60 ML/MIN
GFR NON-AFRICAN AMERICAN: >60 ML/MIN
GFR SERPL CREATININE-BSD FRML MDRD: ABNORMAL ML/MIN/{1.73_M2}
GLUCOSE BLD-MCNC: 105 MG/DL (ref 70–99)
HCG QUALITATIVE: NEGATIVE
HCT VFR BLD CALC: 46.7 % (ref 36.3–47.1)
HEMOGLOBIN: 15.7 G/DL (ref 11.9–15.1)
IMMATURE GRANULOCYTES: 0 %
LYMPHOCYTES # BLD: 37 % (ref 24–43)
MCH RBC QN AUTO: 29.7 PG (ref 25.2–33.5)
MCHC RBC AUTO-ENTMCNC: 33.6 G/DL (ref 28.4–34.8)
MCV RBC AUTO: 88.3 FL (ref 82.6–102.9)
MONOCYTES # BLD: 5 % (ref 3–12)
NRBC AUTOMATED: 0 PER 100 WBC
PDW BLD-RTO: 12.4 % (ref 11.8–14.4)
PLATELET # BLD: ABNORMAL K/UL (ref 138–453)
PLATELET, FLUORESCENCE: 211 K/UL (ref 138–453)
PLATELET, IMMATURE FRACTION: 11.4 % (ref 1.1–10.3)
POTASSIUM SERPL-SCNC: 4.2 MMOL/L (ref 3.7–5.3)
RBC # BLD: 5.29 M/UL (ref 3.95–5.11)
SARS-COV-2, RAPID: NOT DETECTED
SEG NEUTROPHILS: 43 % (ref 36–65)
SEGMENTED NEUTROPHILS ABSOLUTE COUNT: 3.21 K/UL (ref 1.5–8.1)
SODIUM BLD-SCNC: 141 MMOL/L (ref 135–144)
SPECIMEN DESCRIPTION: NORMAL
TROPONIN, HIGH SENSITIVITY: <6 NG/L (ref 0–14)
TROPONIN, HIGH SENSITIVITY: <6 NG/L (ref 0–14)
WBC # BLD: 7.5 K/UL (ref 3.5–11.3)

## 2022-08-09 PROCEDURE — 85379 FIBRIN DEGRADATION QUANT: CPT

## 2022-08-09 PROCEDURE — 94664 DEMO&/EVAL PT USE INHALER: CPT

## 2022-08-09 PROCEDURE — 85025 COMPLETE CBC W/AUTO DIFF WBC: CPT

## 2022-08-09 PROCEDURE — 94640 AIRWAY INHALATION TREATMENT: CPT

## 2022-08-09 PROCEDURE — 71260 CT THORAX DX C+: CPT | Performed by: STUDENT IN AN ORGANIZED HEALTH CARE EDUCATION/TRAINING PROGRAM

## 2022-08-09 PROCEDURE — 84484 ASSAY OF TROPONIN QUANT: CPT

## 2022-08-09 PROCEDURE — 93005 ELECTROCARDIOGRAM TRACING: CPT | Performed by: STUDENT IN AN ORGANIZED HEALTH CARE EDUCATION/TRAINING PROGRAM

## 2022-08-09 PROCEDURE — 87635 SARS-COV-2 COVID-19 AMP PRB: CPT

## 2022-08-09 PROCEDURE — 6360000002 HC RX W HCPCS: Performed by: STUDENT IN AN ORGANIZED HEALTH CARE EDUCATION/TRAINING PROGRAM

## 2022-08-09 PROCEDURE — 71045 X-RAY EXAM CHEST 1 VIEW: CPT

## 2022-08-09 PROCEDURE — 80048 BASIC METABOLIC PNL TOTAL CA: CPT

## 2022-08-09 PROCEDURE — 85055 RETICULATED PLATELET ASSAY: CPT

## 2022-08-09 PROCEDURE — 84703 CHORIONIC GONADOTROPIN ASSAY: CPT

## 2022-08-09 PROCEDURE — 6360000004 HC RX CONTRAST MEDICATION: Performed by: STUDENT IN AN ORGANIZED HEALTH CARE EDUCATION/TRAINING PROGRAM

## 2022-08-09 PROCEDURE — 99285 EMERGENCY DEPT VISIT HI MDM: CPT

## 2022-08-09 RX ORDER — ALBUTEROL SULFATE 2.5 MG/3ML
2.5 SOLUTION RESPIRATORY (INHALATION) EVERY 6 HOURS PRN
Status: DISCONTINUED | OUTPATIENT
Start: 2022-08-09 | End: 2022-08-09 | Stop reason: HOSPADM

## 2022-08-09 RX ORDER — ALBUTEROL SULFATE 90 UG/1
2 AEROSOL, METERED RESPIRATORY (INHALATION) 4 TIMES DAILY PRN
Qty: 54 G | Refills: 1 | Status: SHIPPED | OUTPATIENT
Start: 2022-08-09

## 2022-08-09 RX ADMIN — ALBUTEROL SULFATE 2.5 MG: 2.5 SOLUTION RESPIRATORY (INHALATION) at 08:23

## 2022-08-09 RX ADMIN — IOPAMIDOL 75 ML: 755 INJECTION, SOLUTION INTRAVENOUS at 07:56

## 2022-08-09 ASSESSMENT — ENCOUNTER SYMPTOMS
ABDOMINAL DISTENTION: 0
CONSTIPATION: 0
SINUS PAIN: 0
COUGH: 1
SHORTNESS OF BREATH: 1
DIARRHEA: 0
NAUSEA: 0
ABDOMINAL PAIN: 0
SINUS PRESSURE: 0
SORE THROAT: 0
EYE PAIN: 0
EYE ITCHING: 0

## 2022-08-09 NOTE — ED PROVIDER NOTES
file.    Allergies:  Patient has no known allergies. Home Medications:  Prior to Admission medications    Medication Sig Start Date End Date Taking? Authorizing Provider   albuterol sulfate HFA (VENTOLIN HFA) 108 (90 Base) MCG/ACT inhaler Inhale 2 puffs into the lungs 4 times daily as needed for Wheezing 8/9/22  Yes Suzanne Loera MD       REVIEW OF SYSTEMS    (2-9 systems for level 4, 10 or more for level 5)      Review of Systems   Constitutional:  Negative for activity change, chills and fever. HENT:  Negative for congestion, sinus pressure, sinus pain and sore throat. Eyes:  Negative for pain and itching. Respiratory:  Positive for cough and shortness of breath. Cardiovascular:  Negative for chest pain. Gastrointestinal:  Negative for abdominal distention, abdominal pain, constipation, diarrhea and nausea. Endocrine: Negative for polyuria. Genitourinary:  Negative for dysuria and frequency. Musculoskeletal:  Negative for arthralgias. Skin:  Negative for rash. Neurological:  Negative for light-headedness and headaches. PHYSICAL EXAM   (up to 7 for level 4, 8 or more for level 5)      INITIAL VITALS:   BP (!) 150/113   Pulse 82   Temp 98.1 °F (36.7 °C) (Oral)   Resp 25   SpO2 97%     Physical Exam  Vitals reviewed. Constitutional:       General: She is not in acute distress. HENT:      Head: Normocephalic and atraumatic. Ears:      Comments: Hearing grossly normal     Nose: Nose normal.      Mouth/Throat:      Mouth: Mucous membranes are moist.      Pharynx: Oropharynx is clear. Eyes:      General: No scleral icterus. Conjunctiva/sclera: Conjunctivae normal.      Pupils: Pupils are equal, round, and reactive to light. Cardiovascular:      Rate and Rhythm: Normal rate and regular rhythm. Pulses: Normal pulses. Pulmonary:      Effort: Pulmonary effort is normal. No respiratory distress. Breath sounds: Normal breath sounds.       Comments: Coarse BS bilaterally, speaking in full sentences, satting 93% on RA  Abdominal:      General: There is no distension. Tenderness: There is no abdominal tenderness. There is no guarding. Musculoskeletal:      Cervical back: No muscular tenderness. Right lower leg: No edema. Left lower leg: No edema. Skin:     General: Skin is warm and dry. Capillary Refill: Capillary refill takes less than 2 seconds. Neurological:      General: No focal deficit present. Mental Status: She is alert and oriented to person, place, and time. Mental status is at baseline. DIFFERENTIAL  DIAGNOSIS     PLAN (LABS / IMAGING / EKG):  Orders Placed This Encounter   Procedures    COVID-19, Rapid    XR CHEST PORTABLE    CT CHEST PULMONARY EMBOLISM W CONTRAST    Basic Metabolic Panel    CBC with Auto Differential    Troponin    HCG Qualitative, Serum    D-Dimer, Quantitative    Immature Platelet Fraction    EKG 12 Lead       MEDICATIONS ORDERED:  Orders Placed This Encounter   Medications    iopamidol (ISOVUE-370) 76 % injection 75 mL    DISCONTD: albuterol (PROVENTIL) nebulizer solution 2.5 mg     Order Specific Question:   Initiate RT Bronchodilator Protocol     Answer:   Yes - ED protocol    albuterol sulfate HFA (VENTOLIN HFA) 108 (90 Base) MCG/ACT inhaler     Sig: Inhale 2 puffs into the lungs 4 times daily as needed for Wheezing     Dispense:  54 g     Refill:  1         DIAGNOSTIC RESULTS / EMERGENCY DEPARTMENT COURSE / MDM   LAB RESULTS:  Results for orders placed or performed during the hospital encounter of 08/09/22   COVID-19, Rapid    Specimen: Nasopharyngeal Swab   Result Value Ref Range    Specimen Description . NASOPHARYNGEAL SWAB     SARS-CoV-2, Rapid Not Detected Not Detected   Basic Metabolic Panel   Result Value Ref Range    Glucose 105 (H) 70 - 99 mg/dL    BUN 3 (L) 6 - 20 mg/dL    Creatinine 0.75 0.50 - 0.90 mg/dL    Calcium 9.6 8.6 - 10.4 mg/dL    Sodium 141 135 - 144 mmol/L    Potassium 4.2 3.7 - 5.3 mmol/L    Chloride 105 98 - 107 mmol/L    CO2 17 (L) 20 - 31 mmol/L    Anion Gap 19 (H) 9 - 17 mmol/L    GFR Non-African American >60 >60 mL/min    GFR African American >60 >60 mL/min    GFR Comment         CBC with Auto Differential   Result Value Ref Range    WBC 7.5 3.5 - 11.3 k/uL    RBC 5.29 (H) 3.95 - 5.11 m/uL    Hemoglobin 15.7 (H) 11.9 - 15.1 g/dL    Hematocrit 46.7 36.3 - 47.1 %    MCV 88.3 82.6 - 102.9 fL    MCH 29.7 25.2 - 33.5 pg    MCHC 33.6 28.4 - 34.8 g/dL    RDW 12.4 11.8 - 14.4 %    Platelets See Reflexed IPF Result 138 - 453 k/uL    NRBC Automated 0.0 0.0 per 100 WBC    Seg Neutrophils 43 36 - 65 %    Lymphocytes 37 24 - 43 %    Monocytes 5 3 - 12 %    Eosinophils % 14 (H) 1 - 4 %    Basophils 1 0 - 2 %    Immature Granulocytes 0 0 %    Segs Absolute 3.21 1.50 - 8.10 k/uL    Absolute Lymph # 2.77 1.10 - 3.70 k/uL    Absolute Mono # 0.40 0.10 - 1.20 k/uL    Absolute Eos # 1.05 (H) 0.00 - 0.44 k/uL    Basophils Absolute 0.09 0.00 - 0.20 k/uL    Absolute Immature Granulocyte <0.03 0.00 - 0.30 k/uL   Troponin   Result Value Ref Range    Troponin, High Sensitivity <6 0 - 14 ng/L   Troponin   Result Value Ref Range    Troponin, High Sensitivity <6 0 - 14 ng/L   HCG Qualitative, Serum   Result Value Ref Range    hCG Qual NEGATIVE NEGATIVE   D-Dimer, Quantitative   Result Value Ref Range    D-Dimer, Quant 0.98 mg/L FEU   Immature Platelet Fraction   Result Value Ref Range    Platelet, Immature Fraction 11.4 (H) 1.1 - 10.3 %    Platelet, Fluorescence 211 138 - 453 k/uL   EKG 12 Lead   Result Value Ref Range    Ventricular Rate 90 BPM    Atrial Rate 90 BPM    P-R Interval 150 ms    QRS Duration 70 ms    Q-T Interval 360 ms    QTc Calculation (Bazett) 440 ms    P Axis 55 degrees    R Axis 53 degrees    T Axis 51 degrees       IMPRESSION: Chary Mcdaniel is a 32 y.o. woman presenting for SOB w/coarse breath sounds and mild hypoxia on intial exam. Most concerned for infectious etiology, however did consider PE as patient is also tachycardic and on OCP's. Will obtain D-dimer. Currently otherwise hemodynamically stable, doubt spontaneous pneumo and she has no prior hx of cardiac disease. RADIOLOGY:  CT CHEST PULMONARY EMBOLISM W CONTRAST   Final Result   No evidence of pulmonary embolism. Mild diffuse bronchial wall thickening, right greater than left, suggest the   presence of bronchitis with probably reactive right hilar lymph nodes. Otherwise, acute pulmonary abnormality. XR CHEST PORTABLE   Final Result   Hypoaerated lungs. Medial right lower lung zone airspace opacities may be   related to atelectasis or pneumonia. Correlate with laboratory findings. EKG  Normal rate, sinus, normal intervals, no ST elevations or depressions, T wave inversions aVR and V1    All EKG's are interpreted by the Emergency Department Physician who either signs or Co-signs this chart in the absence of a cardiologist.    EMERGENCY DEPARTMENT COURSE:  Patient seen and evaluated, VSS and nontoxic in appearance. ED Course as of 08/11/22 1031   Tue Aug 09, 2022   0602 WBC: 7.5 [LR]   0602 Hemoglobin Quant(!): 15.7 [LR]   0602 D-Dimer, Quant: 0.98 [LR]   0617 CT PE ordered and pedning [LR]   2421 SARS-CoV-2, Rapid: Not Detected [LR]   7145    IMPRESSION:  Hypoaerated lungs. Medial right lower lung zone airspace opacities may be  related to atelectasis or pneumonia. Correlate with laboratory findings. [LR]      ED Course User Index  [LR] Korin Gale, DO   CT PE pending at  the time of signout    No notes of EC Admission Criteria type on file. PROCEDURES:  none    CONSULTS:  None    CRITICAL CARE:  See attendig note        FINAL IMPRESSION      1.  Bronchitis          DISPOSITION / PLAN     DISPOSITION Decision To Discharge 08/09/2022 08:40:05 AM      PATIENT REFERRED TO:  [facility] ED  [address] [place] [address]  [phone]  Go to   If symptoms worsen    DISCHARGE

## 2022-08-09 NOTE — ED NOTES
The following labs labeled with pt sticker and tubed to lab:     [x] Blue     [x] Lavender   [] on ice  [x] Green/yellow  [x] Green/black [] on ice  [] Yellow  [] Red  [] Pink      [] COVID-19 swab    [] Rapid  [] PCR  [] Flu swab  [] Peds Viral Panel     [] Urine Sample  [] Pelvic Cultures  [] Blood Cultures          Ana Vaelrio LPN  29/22/83 0179

## 2022-08-09 NOTE — ED NOTES
Attempted IV - unsuccessful Ashley Shadow EMT-p to attempt with US. Pt updated.       Lilibeth Souza, BONNIE  08/09/22 0042

## 2022-08-09 NOTE — ED NOTES
Report recd from [address] [place] [address] Rd [address], pt resting on cot without distress,  awaiting PE.        Mello Blakely RN  08/09/22 8303

## 2022-08-09 NOTE — ED PROVIDER NOTES
[address] [facility]     Emergency Department     Faculty Attestation    I performed a history and physical examination of the patient and discussed management with the resident. I have reviewed and agree with the residents findings including all diagnostic interpretations, and treatment plans as written. Any areas of disagreement are noted on the chart. I was personally present for the key portions of any procedures. I have documented in the chart those procedures where I was not present during the key portions. I have reviewed the emergency nurses triage note. I agree with the chief complaint, past medical history, past surgical history, allergies, medications, social and family history as documented unless otherwise noted below. Documentation of the HPI, Physical Exam and Medical Decision Making performed by hansibgillian is based on my personal performance of the HPI, PE and MDM. For Physician Assistant/ Nurse Practitioner cases/documentation I have personally evaluated this patient and have completed at least one if not all key elements of the E/M (history, physical exam, and MDM). Additional findings are as noted. 33 yo F c/o sob & \"chest pressure\" no diaphoresis, no nausea, c/o cough, no fever,   PE Imelda WEEMS escort for exam:   Hr 102 on my exam, gcs 15, neck supple, abdomen non tender, no distension, no rigidity, no guarding, no rebound, no calf swelling, no calf tenderness,     Ct r/o pe pending, care turned over to day shift    EKG Interpretation    Interpreted by me  Normal sinus, hr 90, no ischemia, normal axis, qtc 440    CRITICAL CARE: There was a high probability of clinically significant/life threatening deterioration in this patient's condition which required my urgent intervention. Total critical care time was 5 minutes. This excludes any time for separately reportable procedures.        [place], [place]  08/09/22 7313

## 2022-08-09 NOTE — ED PROVIDER NOTES
[facility] ED  Emergency Department  Emergency Medicine Resident Sign-out     Care of Jn Miguel was assumed from Dr. Chelo Polk and is being seen for Chest Pain and Shortness of Breath  . The patient's initial evaluation and plan have been discussed with the prior provider who initially evaluated the patient. EMERGENCY DEPARTMENT COURSE / MEDICAL DECISION MAKING:       MEDICATIONS GIVEN:  Orders Placed This Encounter   Medications    iopamidol (ISOVUE-370) 76 % injection 75 mL    DISCONTD: albuterol (PROVENTIL) nebulizer solution 2.5 mg     Order Specific Question:   Initiate RT Bronchodilator Protocol     Answer:   Yes - ED protocol    albuterol sulfate HFA (VENTOLIN HFA) 108 (90 Base) MCG/ACT inhaler     Sig: Inhale 2 puffs into the lungs 4 times daily as needed for Wheezing     Dispense:  54 g     Refill:  1       LABS / RADIOLOGY:     Labs Reviewed   BASIC METABOLIC PANEL - Abnormal; Notable for the following components:       Result Value    Glucose 105 (*)     BUN 3 (*)     CO2 17 (*)     Anion Gap 19 (*)     All other components within normal limits   CBC WITH AUTO DIFFERENTIAL - Abnormal; Notable for the following components:    RBC 5.29 (*)     Hemoglobin 15.7 (*)     Eosinophils % 14 (*)     Absolute Eos # 1.05 (*)     All other components within normal limits   IMMATURE PLATELET FRACTION - Abnormal; Notable for the following components:    Platelet, Immature Fraction 11.4 (*)     All other components within normal limits   COVID-19, RAPID   TROPONIN   TROPONIN   HCG, SERUM, QUALITATIVE   D-DIMER, QUANTITATIVE       XR CHEST PORTABLE    Result Date: 8/9/2022  EXAMINATION: ONE XRAY VIEW OF THE CHEST 8/9/2022 5:21 am COMPARISON: 08/08/2007 HISTORY: ORDERING SYSTEM PROVIDED HISTORY: SOB TECHNOLOGIST PROVIDED HISTORY: SOB FINDINGS: The lungs are mildly underinflated. Medial right lower lung zone airspace opacities may be related to atelectasis or pneumonia.   No additional focal

## 2022-08-09 NOTE — ED NOTES
Pt resting on cot with eyes closed. Pt showing no s/s of distress at this time. Respirations even and unlabored. Guard remains at bedside.       Ana Valerio LPN  87/92/14 0386

## 2022-08-09 NOTE — ED TRIAGE NOTES
Pt arrived to Ed with c/o SOB and chest which woke pt put of her sleep. Pt denies any cardiac/lung issues. Pt states she has a productive cough with white/clear mucus. Pt A&O x 4, does not appear in acute distress, RR even and unlabored, resting comfortably on stretcher with eyes open and call light in reach. Vital signs obtained, medical hx and allergies reviewed with pt. Initial assessment performed by physician, Tamika Gómez will carry out initial orders/tasks and reassess pt.      Dr. Edwar Noonan at bedside

## 2022-08-09 NOTE — DISCHARGE INSTRUCTIONS
You are seen in the ER due to chest pain. While you were here, we did an in-depth work-up including blood work, a chest x-ray, and CT scan of your chest.  Your CT scan your chest showed some evidence of bronchitis, which is thickening of the airway that goes to the lungs but no obvious focal pneumonia. Usually this is caused by a virus and will improve on its own with time. Sometimes this can be infected with bacteria after about a week to 10 days, so come back if you have worsening symptoms, fevers, chills, cough, fatigue because you may need antibiotics at that time. Sophie Carry!!!    From [facility]    On behalf of the Emergency Department staff at [facility]. [facility] Emergency Department, I would like to thank you for giving [facility] the opportunity to address your health care needs and concerns. We hope that during your visit, our service was delivered in a professional and caring manner. Please keep [facility] in mind as we walk with you down the path to your own personal wellness. Please expect an automated phone call from [phone] so we can ask a few questions about your health and progress. Based on your answers, a clinician may call you back to offer help and instructions. If you notice any concerning symptoms please return to the ER immediately. These can include but are not limited to: fevers, chills, shortness of breath, vomiting, weakness of the extremities, changes in your mental status, numbness, pale extremities, or chest pain.

## 2022-08-10 LAB
EKG ATRIAL RATE: 90 BPM
EKG P AXIS: 55 DEGREES
EKG P-R INTERVAL: 150 MS
EKG Q-T INTERVAL: 360 MS
EKG QRS DURATION: 70 MS
EKG QTC CALCULATION (BAZETT): 440 MS
EKG R AXIS: 53 DEGREES
EKG T AXIS: 51 DEGREES
EKG VENTRICULAR RATE: 90 BPM

## 2022-08-10 PROCEDURE — 93010 ELECTROCARDIOGRAM REPORT: CPT | Performed by: INTERNAL MEDICINE

## 2023-01-13 ENCOUNTER — APPOINTMENT (OUTPATIENT)
Dept: CT IMAGING | Age: 28
End: 2023-01-13
Payer: MEDICARE

## 2023-01-13 ENCOUNTER — HOSPITAL ENCOUNTER (EMERGENCY)
Age: 28
Discharge: HOME | End: 2023-01-13
Attending: EMERGENCY MEDICINE
Payer: MEDICARE

## 2023-01-13 ENCOUNTER — APPOINTMENT (OUTPATIENT)
Dept: GENERAL RADIOLOGY | Age: 28
End: 2023-01-13
Payer: MEDICARE

## 2023-01-13 VITALS
RESPIRATION RATE: 16 BRPM | WEIGHT: 217 LBS | HEIGHT: 64 IN | DIASTOLIC BLOOD PRESSURE: 75 MMHG | BODY MASS INDEX: 37.05 KG/M2 | HEART RATE: 94 BPM | SYSTOLIC BLOOD PRESSURE: 123 MMHG | TEMPERATURE: 98.1 F | OXYGEN SATURATION: 100 %

## 2023-01-13 DIAGNOSIS — S83.92XA SPRAIN OF LEFT KNEE, UNSPECIFIED LIGAMENT, INITIAL ENCOUNTER: Primary | ICD-10-CM

## 2023-01-13 PROCEDURE — 73590 X-RAY EXAM OF LOWER LEG: CPT

## 2023-01-13 PROCEDURE — 73562 X-RAY EXAM OF KNEE 3: CPT

## 2023-01-13 PROCEDURE — 6360000002 HC RX W HCPCS: Performed by: STUDENT IN AN ORGANIZED HEALTH CARE EDUCATION/TRAINING PROGRAM

## 2023-01-13 PROCEDURE — 96372 THER/PROPH/DIAG INJ SC/IM: CPT

## 2023-01-13 PROCEDURE — 99284 EMERGENCY DEPT VISIT MOD MDM: CPT

## 2023-01-13 PROCEDURE — 6370000000 HC RX 637 (ALT 250 FOR IP): Performed by: STUDENT IN AN ORGANIZED HEALTH CARE EDUCATION/TRAINING PROGRAM

## 2023-01-13 PROCEDURE — 73552 X-RAY EXAM OF FEMUR 2/>: CPT

## 2023-01-13 PROCEDURE — 73700 CT LOWER EXTREMITY W/O DYE: CPT

## 2023-01-13 RX ORDER — OXYCODONE HYDROCHLORIDE 5 MG/1
5 TABLET ORAL EVERY 8 HOURS PRN
Qty: 5 TABLET | Refills: 0 | Status: SHIPPED | OUTPATIENT
Start: 2023-01-13 | End: 2023-01-18

## 2023-01-13 RX ORDER — ACETAMINOPHEN 500 MG
1000 TABLET ORAL EVERY 8 HOURS PRN
Qty: 42 TABLET | Refills: 0 | Status: SHIPPED | OUTPATIENT
Start: 2023-01-13 | End: 2023-01-20

## 2023-01-13 RX ORDER — OXYCODONE HYDROCHLORIDE AND ACETAMINOPHEN 5; 325 MG/1; MG/1
1 TABLET ORAL ONCE
Status: COMPLETED | OUTPATIENT
Start: 2023-01-13 | End: 2023-01-13

## 2023-01-13 RX ORDER — KETOROLAC TROMETHAMINE 30 MG/ML
30 INJECTION, SOLUTION INTRAMUSCULAR; INTRAVENOUS ONCE
Status: COMPLETED | OUTPATIENT
Start: 2023-01-13 | End: 2023-01-13

## 2023-01-13 RX ORDER — IBUPROFEN 400 MG/1
400 TABLET ORAL EVERY 6 HOURS PRN
Qty: 28 TABLET | Refills: 0 | Status: SHIPPED | OUTPATIENT
Start: 2023-01-13 | End: 2023-01-20

## 2023-01-13 RX ADMIN — KETOROLAC TROMETHAMINE 30 MG: 30 INJECTION, SOLUTION INTRAMUSCULAR; INTRAVENOUS at 02:44

## 2023-01-13 RX ADMIN — OXYCODONE HYDROCHLORIDE AND ACETAMINOPHEN 1 TABLET: 5; 325 TABLET ORAL at 04:19

## 2023-01-13 ASSESSMENT — ENCOUNTER SYMPTOMS
PHOTOPHOBIA: 0
RHINORRHEA: 0
CHEST TIGHTNESS: 0
SORE THROAT: 0
DIARRHEA: 0
CONSTIPATION: 0
ABDOMINAL DISTENTION: 0
NAUSEA: 0
ANAL BLEEDING: 0
VOMITING: 0
SHORTNESS OF BREATH: 0

## 2023-01-13 ASSESSMENT — PAIN SCALES - GENERAL
PAINLEVEL_OUTOF10: 8
PAINLEVEL_OUTOF10: 8

## 2023-01-13 ASSESSMENT — PAIN DESCRIPTION - LOCATION: LOCATION: KNEE

## 2023-01-13 ASSESSMENT — PAIN - FUNCTIONAL ASSESSMENT: PAIN_FUNCTIONAL_ASSESSMENT: 0-10

## 2023-01-13 ASSESSMENT — PAIN DESCRIPTION - ORIENTATION: ORIENTATION: LEFT

## 2023-01-13 ASSESSMENT — PAIN DESCRIPTION - DESCRIPTORS: DESCRIPTORS: SORE

## 2023-01-13 NOTE — ED PROVIDER NOTES
[facility]  ED  Emergency Department Encounter  EmergencyMedicine Resident     Pt Name:Shannon Dennie Listen  MRN: [id number]  [place] 1995  Date of evaluation: 1/13/23  PCP:  No primary care provider on file. CHIEF COMPLAINT       Chief Complaint   Patient presents with    Knee Pain     Left knee pain after mechanical fall       HISTORY OF PRESENT ILLNESS  (Location/Symptom, Timing/Onset, Context/Setting, Quality, Duration, Modifying Factors, Severity.)      Janeen Coppola is a 32 y.o. female who presents with twisted her knee and she fell to the ground she is unable stand up due to the pain. She has not entirely sure how she injured it but denies hitting her head or losing consciousness. She denies any prodromal symptoms such as chest pain shortness of breath or dizziness prior to fall. She denies any anticoagulation use    PAST MEDICAL / SURGICAL / SOCIAL / FAMILY HISTORY      has a past medical history of Anemia, Gestational diabetes, and Hypertension. has no past surgical history on file.       Social History     Socioeconomic History    Marital status: Single     Spouse name: Not on file    Number of children: Not on file    Years of education: Not on file    Highest education level: Not on file   Occupational History    Not on file   Tobacco Use    Smoking status: Former     Packs/day: 0.00     Years: 0.00     Pack years: 0.00     Types: Cigarettes    Smokeless tobacco: Never    Tobacco comments:     states that shes a former smoker   Vaping Use    Vaping Use: Never used   Substance and Sexual Activity    Alcohol use: No    Drug use: No    Sexual activity: Yes     Partners: Male   Other Topics Concern    Not on file   Social History Narrative    Not on file     Social Determinants of Health     Financial Resource Strain: Not on file   Food Insecurity: Not on file   Transportation Needs: Not on file   Physical Activity: Not on file   Stress: Not on file   Social Connections: Not on file   Intimate Partner Violence: Not on file   Housing Stability: Not on file       No family history on file. Allergies:  Patient has no known allergies. Home Medications:  Prior to Admission medications    Medication Sig Start Date End Date Taking? Authorizing Provider   ibuprofen (IBU) 400 MG tablet Take 1 tablet by mouth every 6 hours as needed for Pain 1/13/23 1/20/23 Yes Amie Montaño, DO   acetaminophen (TYLENOL) 500 MG tablet Take 2 tablets by mouth every 8 hours as needed for Pain 1/13/23 1/20/23 Yes Amie Montaño DO   oxyCODONE (ROXICODONE) 5 MG immediate release tablet Take 1 tablet by mouth every 8 hours as needed for Pain for up to 5 doses. Intended supply: 3 days. Take lowest dose possible to manage pain Max Daily Amount: 15 mg 1/13/23 1/18/23 Yes Amie Montaño DO   albuterol sulfate HFA (VENTOLIN HFA) 108 (90 Base) MCG/ACT inhaler Inhale 2 puffs into the lungs 4 times daily as needed for Wheezing 8/9/22   Javier Fall MD       REVIEW OF SYSTEMS    (2-9 systems for level 4, 10 or more for level 5)      Review of Systems   Constitutional:  Negative for chills and fever. HENT:  Negative for rhinorrhea and sore throat. Eyes:  Negative for photophobia. Respiratory:  Negative for chest tightness and shortness of breath. Cardiovascular:  Negative for chest pain. Gastrointestinal:  Negative for abdominal distention, anal bleeding, constipation, diarrhea, nausea and vomiting. Endocrine: Negative for polyuria. Genitourinary:  Negative for difficulty urinating and flank pain. Musculoskeletal:  Negative for arthralgias and neck pain. Skin:  Negative for rash. Neurological:  Negative for weakness and headaches.      PHYSICAL EXAM   (up to 7 for level 4, 8 or more for level 5)      INITIAL VITALS:   /75   Pulse 94   Temp 98.1 °F (36.7 °C) (Oral)   Resp 16   Ht 5' 4\" (1.626 m)   Wt 217 lb (98.4 kg)   SpO2 100%   BMI 37.25 kg/m²     Physical Exam  Constitutional:       General: She is not in acute distress. Appearance: She is not ill-appearing. HENT:      Head: Normocephalic and atraumatic. Nose: Nose normal.      Mouth/Throat:      Mouth: Mucous membranes are moist.   Cardiovascular:      Rate and Rhythm: Normal rate. Pulses: Normal pulses. Heart sounds: Normal heart sounds. Pulmonary:      Effort: Pulmonary effort is normal.      Breath sounds: Normal breath sounds. Abdominal:      Palpations: Abdomen is soft. Tenderness: There is no abdominal tenderness. Musculoskeletal:      Cervical back: No tenderness. Comments: Left lower extremity is externally rotated exquisite tenderness around left knee and edema. Patella appears midline around the knee without any superior inferior tracking. Nonpalpable pulses in the left lower extremity however they are Doppler audible foot is very cold due to being outside. No femur or tib-fib or pelvic tenderness   Skin:     Capillary Refill: Capillary refill takes less than 2 seconds. Neurological:      General: No focal deficit present. Mental Status: She is oriented to person, place, and time.        DIFFERENTIAL  DIAGNOSIS     PLAN (LABS / IMAGING / EKG):  Orders Placed This Encounter   Procedures    XR FEMUR LEFT (MIN 2 VIEWS)    XR KNEE LEFT (3 VIEWS)    XR TIBIA FIBULA LEFT (2 VIEWS)    CT KNEE LEFT WO CONTRAST    [employer] Crutches; Pair, Left Side Injury; Med (5'2\"-5'10\")    [employer] Knee Immobilizer, Left; 14\"       MEDICATIONS ORDERED:  Orders Placed This Encounter   Medications    ketorolac (TORADOL) injection 30 mg    oxyCODONE-acetaminophen (PERCOCET) 5-325 MG per tablet 1 tablet    ibuprofen (IBU) 400 MG tablet     Sig: Take 1 tablet by mouth every 6 hours as needed for Pain     Dispense:  28 tablet     Refill:  0    acetaminophen (TYLENOL) 500 MG tablet     Sig: Take 2 tablets by mouth every 8 hours as needed for Pain     Dispense:  42 tablet     Refill:  0    oxyCODONE (ROXICODONE) 5 MG immediate release tablet     Sig: Take 1 tablet by mouth every 8 hours as needed for Pain for up to 5 doses. Intended supply: 3 days. Take lowest dose possible to manage pain Max Daily Amount: 15 mg     Dispense:  5 tablet     Refill:  0       DIAGNOSTIC RESULTS / EMERGENCY DEPARTMENT COURSE / MDM   LAB RESULTS:  No results found for this visit on 01/13/23. RADIOLOGY:  XR FEMUR LEFT (MIN 2 VIEWS)    Result Date: 1/13/2023  Possible trace knee joint effusion but otherwise unremarkable left femur, left knee and left tibia/fibula. XR KNEE LEFT (3 VIEWS)    Result Date: 1/13/2023  Possible trace knee joint effusion but otherwise unremarkable left femur, left knee and left tibia/fibula. XR TIBIA FIBULA LEFT (2 VIEWS)    Result Date: 1/13/2023  Possible trace knee joint effusion but otherwise unremarkable left femur, left knee and left tibia/fibula. CT KNEE LEFT WO CONTRAST    Result Date: 1/13/2023  No fracture evident. Trace knee joint effusion. Mild soft tissue edema anterolateral to the knee. EKG Interpretation  None    [facility]  Medical Decision Making  Patient here for likely ligamentous injury of the knee. Neurovascular intact. No fracture on x-ray or CT imaging. Patient discharged in knee immobilizer and crutches with Ortho follow-up. Amount and/or Complexity of Data Reviewed  Radiology: ordered. Decision-making details documented in ED Course. Risk  OTC drugs. Prescription drug management. EMERGENCY DEPARTMENT COURSE:  ED Course as of 01/13/23 0546   Fri Jan 13, 2023   0221 Patient value bedside. Twisted knee and ankle stand up. There is deformity. Nonpalpable pulses however Doppler audible pulses obtained. Will get x-ray give Toradol. [ZE]   0328 Negative x-rays. Patient with potential ligamentous injury. Will discharge [ZE]   0410 Patient does have equal pulses in both feet.   Low suspicion for any vasculature injury. We will get a CT scan to evaluate if there is a possible occult tibial plateau fracture [ZE]      ED Course User Index  [ZE] Giles Ping DO       PROCEDURES:  Procedures     CONSULTS:  None    CRITICAL CARE:  See MDM    FINAL IMPRESSION      1. Sprain of left knee, unspecified ligament, initial encounter        DISPOSITION / PLAN     DISPOSITION Decision To Discharge 01/13/2023 05:41:20 AM      PATIENT REFERRED TO:  [address] [facility]  [phone]  In 2 days  Call for appointment about knee injury    DISCHARGE MEDICATIONS:  New Prescriptions    ACETAMINOPHEN (TYLENOL) 500 MG TABLET    Take 2 tablets by mouth every 8 hours as needed for Pain    IBUPROFEN (IBU) 400 MG TABLET    Take 1 tablet by mouth every 6 hours as needed for Pain    OXYCODONE (ROXICODONE) 5 MG IMMEDIATE RELEASE TABLET    Take 1 tablet by mouth every 8 hours as needed for Pain for up to 5 doses. Intended supply: 3 days.  Take lowest dose possible to manage pain Max Daily Amount: 15 mg       Janene Casanova DO  Emergency Medicine Resident    (Please note that portions of thisnote were completed with a voice recognition program.  Efforts were made to edit the dictations but occasionally words are mis-transcribed.)        Giles Feng DO  Resident  01/13/23 9418

## 2023-01-13 NOTE — DISCHARGE INSTRUCTIONS
January 24, 2020     Patient: Maria Esther Davila   YOB: 1969   Date of Visit: 1/24/2020       To Whom It May Concern: It is my medical opinion that Kathy Keita may return to work on 1/27/2020  If you have any questions or concerns, please don't hesitate to call           Sincerely,        Binu Arauz MD    CC: No Recipients You likely damage on your ligaments in your knee and you will need to have an MRI to confirm this. You can have this MRI ordered by a either an orthopedic surgeon or your primary care provider. Call the number listed above for an orthopedic surgeon. Ice the knee daily for the next several days and try to keep it elevated to reduce swelling. Use Tylenol Motrin as needed for pain control. If your symptoms worsen in any way return to the emergency department or call 911.

## 2023-01-13 NOTE — ED NOTES
Patient here c/o left knee pain. Patient states she was walking and stepped on an uneven patch of grass and twisted her left knee. States she immediately felt pain and is unable to bear much weight on left lower extremity. Denies hitting head or any other injuries. Denies dizziness or weakness before or after fall. PMS intact on LLE. On arrival, patient able to stand and transfer from wheelchair to stretcher. A+Ox4, respirations even and unlabored, speaking in complete sentences.       Laren Meckel, RN  01/13/23 3705

## 2023-01-13 NOTE — ED NOTES
Patient and her significant other are still waiting in the ED Lobby and SW has not gotten any information from [employer] regarding their ride. SW will set up a [address] to take patient home as she has been waiting about 3 hours now. Sachin Chirinos.  Faiza [place], [place]  01/13/23 9008

## 2023-01-13 NOTE — ED NOTES
Dr. Carlos Leonard at bedside     [place], [address] [place] [address]  01/13/23 2745

## 2023-01-13 NOTE — Clinical Note
Orestes Garcia was seen and treated in our emergency department on 1/13/2023. She may return to work on 01/16/2023. If you have any questions or concerns, please don't hesitate to call.       Ana Cristina Amatot, DO

## 2023-01-13 NOTE — ED NOTES
.Patient in need of transportation home. SW contacted [facility]. ETA unknown.       Tyree Irwin [facility]  01/13/23 0691

## 2023-01-17 NOTE — ED PROVIDER NOTES
[facility]   Emergency Department  Faculty Attestation       I performed a history and physical examination of the patient and discussed management with the resident. I reviewed the residents note and agree with the documented findings including all diagnostic interpretations and plan of care. Any areas of disagreement are noted on the chart. I was personally present for the key portions of any procedures. I have documented in the chart those procedures where I was not present during the key portions. I have reviewed the emergency nurses triage note. I agree with the chief complaint, past medical history, past surgical history, allergies, medications, social and family history as documented unless otherwise noted below. Documentation of the HPI, Physical Exam and Medical Decision Making performed by hansibgillian is based on my personal performance of the HPI, PE and MDM. For Physician Assistant/ Nurse Practitioner cases/documentation I have personally evaluated this patient and have completed at least one if not all key elements of the E/M (history, physical exam, and MDM). Additional findings are as noted. Pertinent Comments     Primary Care Physician: No primary care provider on file. ED Triage Vitals [01/13/23 0202]   BP Temp Temp Source Heart Rate Resp SpO2 Height Weight   123/75 98.1 °F (36.7 °C) Oral 94 16 100 % 5' 4\" (1.626 m) 217 lb (98.4 kg)          This is a 32 y.o. female who presents to the Emergency Department left knee pain after a fall where patient states the knee twisted and she went down to the ground. Did not strike her head. No LOC. Unable to ambulate on the leg at this time due to pain. On initial exam patient was still wearing leggings at that time. She did have palpapble pulse in the left lower leg and sensation intact. Offered to cut leggings vs assist her in removing the leggings, but patient opted to have her significant other help remove the leggings. Returned back to the room for repeat exam.  Patient's left leg is externally rotated and shortened in appearance with slight bending at the knee and obvious edema compared to the right. No erythema or warmth. Significant tenderness. Unable to tolerate any passive or active ROM of the knee. Can move at the hip and normal ROM of the ankle with 2+ pulses and normal capillary refill and sensation intact. Medical Decision Making  DDx: MSK injury, ligamentous injury, meniscal injury, fracture  Knee pain. No clinical s/s of septic joint  No lacerations or wounds. Swelling and malpositioning, but patella does line up - no signs of knee dislocation or patellar dislocation. Likely ligamentous injury vs meniscal but unable to tolerate exam.    Xray negative however patient still unable to move the knee giving extensive swelling did obtain a CT knee   CT scan negative for any acute fractures. Placed in knee immobilizer and crutches. F/u Ortho  Ok for d/c    Problems Addressed:  Sprain of left knee, unspecified ligament, initial encounter: acute illness or injury    Amount and/or Complexity of Data Reviewed  Independent Historian: spouse  Radiology: ordered and independent interpretation performed. Details: Xray of knee reviewed and there appears to be mild effusion on xray but no signs of bony injury or patellar malalignment    Risk  OTC drugs. Prescription drug management.     Critical Care  Total time providing critical care: (None)          Janice Gray MD  Attending Emergency Physician        Janice Gray MD  01/17/23 8626

## 2023-06-24 ENCOUNTER — HOSPITAL ENCOUNTER (EMERGENCY)
Age: 28
Discharge: HOME | End: 2023-06-24
Attending: EMERGENCY MEDICINE
Payer: MEDICAID

## 2023-06-24 VITALS
RESPIRATION RATE: 14 BRPM | SYSTOLIC BLOOD PRESSURE: 133 MMHG | TEMPERATURE: 98.6 F | HEART RATE: 83 BPM | OXYGEN SATURATION: 99 % | DIASTOLIC BLOOD PRESSURE: 87 MMHG

## 2023-06-24 DIAGNOSIS — R44.3 HALLUCINATIONS: Primary | ICD-10-CM

## 2023-06-24 DIAGNOSIS — F41.9 ANXIETY: ICD-10-CM

## 2023-06-24 PROCEDURE — 99282 EMERGENCY DEPT VISIT SF MDM: CPT

## 2023-06-24 ASSESSMENT — ENCOUNTER SYMPTOMS
SHORTNESS OF BREATH: 0
ABDOMINAL PAIN: 0

## 2023-06-24 NOTE — ED PROVIDER NOTES
[facility]     Emergency Department     Faculty Attestation    I performed a history and physical examination of the patient and discussed management with the resident. I have reviewed and agree with the residents findings including all diagnostic interpretations, and treatment plans as written. Any areas of disagreement are noted on the chart. I was personally present for the key portions of any procedures. I have documented in the chart those procedures where I was not present during the key portions. I have reviewed the emergency nurses triage note. I agree with the chief complaint, past medical history, past surgical history, allergies, medications, social and family history as documented unless otherwise noted below. Documentation of the HPI, Physical Exam and Medical Decision Making performed by hansibgillina is based on my personal performance of the HPI, PE and MDM. For Physician Assistant/ Nurse Practitioner cases/documentation I have personally evaluated this patient and have completed at least one if not all key elements of the E/M (history, physical exam, and MDM). Additional findings are as noted. Note Started: 2:57 AM EDT     26 yo F c/o anxiety, no suicidal or homicidal ideation, patient denies injury, denies fever, denies vomiting,  Patient relates having her children taken away from her today  PE vss flat affect, no indication of acute psychosis, gcs 15, matthieu, neck supple with full rom, no cervical tenderness, extremities x4 neurovascularly intact, atraumatic,    Social work arranging transfer to [employer]     EKG Interpretation    Interpreted by me      CRITICAL CARE: There was a high probability of clinically significant/life threatening deterioration in this patient's condition which required my urgent intervention. Total critical care time was 0 minutes. This excludes any time for separately reportable procedures.        Sugey Flores DO

## 2023-06-24 NOTE — ED PROVIDER NOTES
[facility] ED  Emergency Department Encounter  Emergency Medicine Resident     Pt Kemal Curran  MRN: [id number]  [place] 1995  Date of evaluation: 6/24/23  PCP:  No primary care provider on file. Note Started: 2:09 AM EDT      CHIEF COMPLAINT       Chief Complaint   Patient presents with    Anxiety     Lost her kids today, feelings of a being a bad mom. Denies SI/HI. Darren gave her anti-anxiety meds today, unsure of names       HISTORY OF PRESENT ILLNESS  (Location/Symptom, Timing/Onset, Context/Setting, Quality, Duration, Modifying Factors, Severity.)      Jackie Douglas is a 25 y.o. female who presents with concern of worsening anxiety, depression, hearing voices. Patient states he had a very stressful day today, had her children taken away. States he is hearing voices are telling her that she has a bad mother. Denies any command hallucinations. Denies any suicidal ideation, no homicidal ideation. States she does have medications for her depression and anxiety, states she has been taking it as prescribed. Does follow with Darren. Denies any medical complaints. Inquiring about psychiatric admission. Presents with friend with similar complaints. PAST MEDICAL / SURGICAL / SOCIAL / FAMILY HISTORY      has a past medical history of Anemia, Gestational diabetes, and Hypertension. has no past surgical history on file.       Social History     Socioeconomic History    Marital status: Single     Spouse name: Not on file    Number of children: Not on file    Years of education: Not on file    Highest education level: Not on file   Occupational History    Not on file   Tobacco Use    Smoking status: Former     Packs/day: 0.00     Years: 0.00     Pack years: 0.00     Types: Cigarettes    Smokeless tobacco: Never    Tobacco comments:     states that shes a former smoker   Vaping Use    Vaping Use: Never used   Substance and Sexual Activity    Alcohol use: No    Drug

## 2023-06-24 NOTE — DISCHARGE INSTRUCTIONS
Please go directly to the [facility] crisis center. Please follow with your primary care provider. Please return the emergency room if you develop any worsening or concerning symptoms.

## 2023-06-24 NOTE — ED TRIAGE NOTES
Pt ambulatory to ED 47 from triage with significant other. Pt is a/o x4. Pt stated that she lost her kids today and has had anxiety and is depressed. Pt denies SI/HI. Pt stated she went to [facility] today and they started her on two medications today, she is unsure of what they are. Pt vitals assessed and noted. NAD noted. Call light provided. Will continue to monitor.

## 2024-04-19 ENCOUNTER — HOSPITAL ENCOUNTER (EMERGENCY)
Age: 29
Discharge: HOME | End: 2024-04-19
Attending: EMERGENCY MEDICINE

## 2024-04-19 ENCOUNTER — APPOINTMENT (OUTPATIENT)
Dept: GENERAL RADIOLOGY | Age: 29
End: 2024-04-19

## 2024-04-19 VITALS
OXYGEN SATURATION: 99 % | HEART RATE: 74 BPM | WEIGHT: 198.85 LBS | RESPIRATION RATE: 16 BRPM | DIASTOLIC BLOOD PRESSURE: 70 MMHG | BODY MASS INDEX: 36.59 KG/M2 | TEMPERATURE: 98.2 F | SYSTOLIC BLOOD PRESSURE: 104 MMHG | HEIGHT: 62 IN
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
## 2024-04-19 PROCEDURE — 12002 RPR S/N/AX/GEN/TRNK2.6-7.5CM: CPT

## 2024-04-19 PROCEDURE — 90471 IMMUNIZATION ADMIN: CPT | Performed by: STUDENT IN AN ORGANIZED HEALTH CARE EDUCATION/TRAINING PROGRAM

## 2024-04-19 PROCEDURE — 6360000002 HC RX W HCPCS: Performed by: STUDENT IN AN ORGANIZED HEALTH CARE EDUCATION/TRAINING PROGRAM

## 2024-04-19 PROCEDURE — 90715 TDAP VACCINE 7 YRS/> IM: CPT | Performed by: STUDENT IN AN ORGANIZED HEALTH CARE EDUCATION/TRAINING PROGRAM

## 2024-04-19 PROCEDURE — 6370000000 HC RX 637 (ALT 250 FOR IP): Performed by: STUDENT IN AN ORGANIZED HEALTH CARE EDUCATION/TRAINING PROGRAM

## 2024-04-19 PROCEDURE — 73090 X-RAY EXAM OF FOREARM: CPT

## 2024-04-19 PROCEDURE — 99284 EMERGENCY DEPT VISIT MOD MDM: CPT

## 2024-04-19 RX ORDER — OXYCODONE HYDROCHLORIDE 5 MG/1
5 TABLET ORAL ONCE
Status: COMPLETED | OUTPATIENT
Start: 2024-04-19 | End: 2024-04-19

## 2024-04-19 RX ORDER — AMOXICILLIN AND CLAVULANATE POTASSIUM 875; 125 MG/1; MG/1
1 TABLET, FILM COATED ORAL 2 TIMES DAILY
Qty: 20 TABLET | Refills: 0 | Status: SHIPPED | OUTPATIENT
Start: 2024-04-19 | End: 2024-04-29

## 2024-04-19 RX ORDER — IBUPROFEN 800 MG/1
800 TABLET ORAL ONCE
Status: COMPLETED | OUTPATIENT
Start: 2024-04-19 | End: 2024-04-19

## 2024-04-19 RX ORDER — ACETAMINOPHEN 500 MG
1000 TABLET ORAL ONCE
Status: COMPLETED | OUTPATIENT
Start: 2024-04-19 | End: 2024-04-19

## 2024-04-19 RX ORDER — AMOXICILLIN AND CLAVULANATE POTASSIUM 875; 125 MG/1; MG/1
1 TABLET, FILM COATED ORAL EVERY 12 HOURS SCHEDULED
Status: DISCONTINUED | OUTPATIENT
Start: 2024-04-19 | End: 2024-04-19 | Stop reason: HOSPADM

## 2024-04-19 RX ADMIN — AMOXICILLIN AND CLAVULANATE POTASSIUM 1 TABLET: 875; 125 TABLET, FILM COATED ORAL at 10:07

## 2024-04-19 RX ADMIN — ACETAMINOPHEN 1000 MG: 500 TABLET ORAL at 10:07

## 2024-04-19 RX ADMIN — OXYCODONE 5 MG: 5 TABLET ORAL at 10:07

## 2024-04-19 RX ADMIN — TETANUS TOXOID, REDUCED DIPHTHERIA TOXOID AND ACELLULAR PERTUSSIS VACCINE, ADSORBED 0.5 ML: 5; 2.5; 8; 8; 2.5 SUSPENSION INTRAMUSCULAR at 10:08

## 2024-04-19 RX ADMIN — IBUPROFEN 800 MG: 800 TABLET, FILM COATED ORAL at 10:07

## 2024-04-19 ASSESSMENT — PAIN DESCRIPTION - LOCATION: LOCATION: ARM

## 2024-04-19 ASSESSMENT — ENCOUNTER SYMPTOMS
ABDOMINAL PAIN: 0
SHORTNESS OF BREATH: 0

## 2024-04-19 ASSESSMENT — PAIN SCALES - GENERAL
PAINLEVEL_OUTOF10: 10
PAINLEVEL_OUTOF10: 10

## 2024-04-19 ASSESSMENT — LIFESTYLE VARIABLES
HOW OFTEN DO YOU HAVE A DRINK CONTAINING ALCOHOL: NEVER
HOW OFTEN DO YOU HAVE A DRINK CONTAINING ALCOHOL: NEVER

## 2024-04-19 ASSESSMENT — PAIN DESCRIPTION - PAIN TYPE: TYPE: ACUTE PAIN

## 2024-04-19 ASSESSMENT — PAIN - FUNCTIONAL ASSESSMENT: PAIN_FUNCTIONAL_ASSESSMENT: 0-10

## 2024-04-19 ASSESSMENT — PAIN DESCRIPTION - ORIENTATION: ORIENTATION: RIGHT

## 2024-04-19 NOTE — DISCHARGE INSTRUCTIONS
You have been seen in the ER today for dog bite.  You had 3 stitches placed, your wound was loosely approximated so that the risk of infection would be minimized.  You will be started on an antibiotic, please take this antibiotic until the course is completed.  Please be seen by your primary care provider or return to the ED  in approximately 3 to 5 days for reevaluation of your wound.  If you begin to experience any symptoms such as chest pain shortness of breath nausea vomiting dizziness drowsiness abdominal pain loss of consciousness or any other symptoms you find concerning please return to the ED for follow-up evaluation.  If you have been given pain medication please take them only as prescribed. Do not take more medication than prescribed at any given time.  Please follow-up with your primary care provider within 3-5 days for continued care, sooner if you have concerns.

## 2024-04-19 NOTE — ED PROVIDER NOTES
[facility] ED  Emergency Department Encounter  Emergency Medicine Resident     Pt Name:Tori Barrow  MRN: [id number]  Birthdate 1995  Date of evaluation: 4/19/24  PCP:  No primary care provider on file.  Note Started: 9:38 AM EDT      CHIEF COMPLAINT       Chief Complaint   Patient presents with    Animal Bite       HISTORY OF PRESENT ILLNESS  (Location/Symptom, Timing/Onset, Context/Setting, Quality, Duration, Modifying Factors, Severity.)      Tori Barrow is a 28 y.o. female who presents with dog bite to the right forearm.  Patient was released from detention earlier this morning, she went home, she was bitten by a neighborhood dog, dog had a collar and had an owner.  It was a pitbull mix apparently.  Patient does not have any other medical problems, she is morbidly obese.  She has no fevers chills nausea vomiting or other constitutional symptoms.     PAST MEDICAL / SURGICAL / SOCIAL / FAMILY HISTORY      has a past medical history of Anemia, Gestational diabetes, and Hypertension.       has no past surgical history on file.      Social History     Socioeconomic History    Marital status: Single     Spouse name: Not on file    Number of children: Not on file    Years of education: Not on file    Highest education level: Not on file   Occupational History    Not on file   Tobacco Use    Smoking status: Former     Current packs/day: 0.00     Types: Cigarettes    Smokeless tobacco: Never    Tobacco comments:     states that shes a former smoker   Vaping Use    Vaping Use: Never used   Substance and Sexual Activity    Alcohol use: No    Drug use: No    Sexual activity: Yes     Partners: Male   Other Topics Concern    Not on file   Social History Narrative    Not on file     Social Determinants of Health     Financial Resource Strain: Not on file   Food Insecurity: Not on file   Transportation Needs: Not on file   Physical Activity: Not on file   Stress: Not on file   Social Connections:  MEDICATIONS:  New Prescriptions    AMOXICILLIN-CLAVULANATE (AUGMENTIN) 875-125 MG PER TABLET    Take 1 tablet by mouth 2 times daily for 10 days       Reggie De La O DO  Emergency Medicine Resident    (Please note that portions of this note were completed with a voice recognition program.  Efforts were made to edit the dictations but occasionally words are mis-transcribed.)

## 2024-04-19 NOTE — ED PROVIDER NOTES
[facility] ED     Emergency Department     Faculty Attestation    I performed a history and physical examination of the patient and discussed management with the resident. I reviewed the resident’s note and agree with the documented findings and plan of care. Any areas of disagreement are noted on the chart. I was personally present for the key portions of any procedures. I have documented in the chart those procedures where I was not present during the key portions. I have reviewed the emergency nurses triage note. I agree with the chief complaint, past medical history, past surgical history, allergies, medications, social and family history as documented unless otherwise noted below. For Physician Assistant/ Nurse Practitioner cases/documentation I have personally evaluated this patient and have completed at least one if not all key elements of the E/M (history, physical exam, and MDM). Additional findings are as noted.    Note Started: 10:35 AM EDT    Patient here with dog bite to the right forearm.  She is right-handed.  States she was out walking this morning and came around a corner dog saw her and grabbed her right arm.  Does think it was a pitbull or a pit mix.  On exam does have 2 wounds dorsal volar proximal forearm distally intact bleeding is controlled normal cap refill, intact remainder ulnar nerve for motor and sensation.  Unsure of tetanus will update.  Will image antibiotics the dorsal wound will need loose approximation due to gaping      Critical Care     none    Matthias Terrell MD, FACEP, FAAEM  Attending Emergency  Physician           Matthias Terrell MD  04/19/24 1037

## 2024-11-03 ENCOUNTER — HOSPITAL ENCOUNTER (EMERGENCY)
Age: 29
Discharge: HOME | End: 2024-11-03
Attending: EMERGENCY MEDICINE
Payer: MEDICAID

## 2024-11-03 VITALS
TEMPERATURE: 97.6 F | HEIGHT: 64 IN | OXYGEN SATURATION: 97 % | WEIGHT: 200 LBS | BODY MASS INDEX: 34.15 KG/M2 | HEART RATE: 86 BPM | RESPIRATION RATE: 18 BRPM | DIASTOLIC BLOOD PRESSURE: 77 MMHG | SYSTOLIC BLOOD PRESSURE: 124 MMHG

## 2024-11-03 DIAGNOSIS — R11.2 NAUSEA AND VOMITING, UNSPECIFIED VOMITING TYPE: Primary | ICD-10-CM

## 2024-11-03 DIAGNOSIS — R30.0 DYSURIA: ICD-10-CM

## 2024-11-03 DIAGNOSIS — R10.9 ACUTE FLANK PAIN: ICD-10-CM

## 2024-11-03 DIAGNOSIS — Z32.01 POSITIVE PREGNANCY TEST: ICD-10-CM

## 2024-11-03 DIAGNOSIS — N10 ACUTE PYELONEPHRITIS: ICD-10-CM

## 2024-11-03 LAB
ALBUMIN SERPL-MCNC: 4.5 G/DL (ref 3.5–5.2)
ALBUMIN/GLOB SERPL: 1.5 {RATIO} (ref 1–2.5)
ALP SERPL-CCNC: 69 U/L (ref 35–104)
ALT SERPL-CCNC: 70 U/L (ref 10–35)
ANION GAP SERPL CALCULATED.3IONS-SCNC: 11 MMOL/L (ref 9–16)
AST SERPL-CCNC: 39 U/L (ref 10–35)
BACTERIA URNS QL MICRO: ABNORMAL
BASOPHILS # BLD: 0.04 K/UL (ref 0–0.2)
BASOPHILS NFR BLD: 1 % (ref 0–2)
BILIRUB SERPL-MCNC: 0.2 MG/DL (ref 0–1.2)
BILIRUB UR QL STRIP: NEGATIVE
BUN SERPL-MCNC: 10 MG/DL (ref 6–20)
CALCIUM SERPL-MCNC: 9.8 MG/DL (ref 8.6–10.4)
CANDIDA SPECIES: NEGATIVE
CASTS #/AREA URNS LPF: ABNORMAL /LPF (ref 0–8)
CHLORIDE SERPL-SCNC: 100 MMOL/L (ref 98–107)
CLARITY UR: ABNORMAL
CO2 SERPL-SCNC: 22 MMOL/L (ref 20–31)
COLOR UR: YELLOW
CREAT SERPL-MCNC: 0.5 MG/DL (ref 0.6–0.9)
EOSINOPHIL # BLD: 0.15 K/UL (ref 0–0.44)
EOSINOPHILS RELATIVE PERCENT: 2 % (ref 1–4)
EPI CELLS #/AREA URNS HPF: ABNORMAL /HPF (ref 0–5)
ERYTHROCYTE [DISTWIDTH] IN BLOOD BY AUTOMATED COUNT: 12.8 % (ref 11.8–14.4)
GARDNERELLA VAGINALIS: NEGATIVE
GFR, ESTIMATED: >90 ML/MIN/1.73M2
GLUCOSE SERPL-MCNC: 87 MG/DL (ref 74–99)
GLUCOSE UR STRIP-MCNC: NEGATIVE MG/DL
HCG SERPL QL: POSITIVE
HCT VFR BLD AUTO: 40.9 % (ref 36.3–47.1)
HGB BLD-MCNC: 13.2 G/DL (ref 11.9–15.1)
HGB UR QL STRIP.AUTO: NEGATIVE
IMM GRANULOCYTES # BLD AUTO: <0.03 K/UL (ref 0–0.3)
IMM GRANULOCYTES NFR BLD: 0 %
KETONES UR STRIP-MCNC: NEGATIVE MG/DL
LEUKOCYTE ESTERASE UR QL STRIP: ABNORMAL
LYMPHOCYTES NFR BLD: 1.79 K/UL (ref 1.1–3.7)
LYMPHOCYTES RELATIVE PERCENT: 24 % (ref 24–43)
MCH RBC QN AUTO: 28.8 PG (ref 25.2–33.5)
MCHC RBC AUTO-ENTMCNC: 32.3 G/DL (ref 28.4–34.8)
MCV RBC AUTO: 89.3 FL (ref 82.6–102.9)
MONOCYTES NFR BLD: 0.58 K/UL (ref 0.1–1.2)
MONOCYTES NFR BLD: 8 % (ref 3–12)
NEUTROPHILS NFR BLD: 65 % (ref 36–65)
NEUTS SEG NFR BLD: 5.03 K/UL (ref 1.5–8.1)
NITRITE UR QL STRIP: POSITIVE
NRBC BLD-RTO: 0 PER 100 WBC
PH UR STRIP: 7 [PH] (ref 5–8)
PLATELET # BLD AUTO: 223 K/UL (ref 138–453)
PMV BLD AUTO: 12.3 FL (ref 8.1–13.5)
POTASSIUM SERPL-SCNC: 4.2 MMOL/L (ref 3.7–5.3)
PROT SERPL-MCNC: 7.5 G/DL (ref 6.6–8.7)
PROT UR STRIP-MCNC: NEGATIVE MG/DL
RBC # BLD AUTO: 4.58 M/UL (ref 3.95–5.11)
RBC #/AREA URNS HPF: ABNORMAL /HPF (ref 0–4)
SODIUM SERPL-SCNC: 133 MMOL/L (ref 136–145)
SOURCE: NORMAL
SP GR UR STRIP: 1.02 (ref 1–1.03)
TRICHOMONAS: NEGATIVE
UROBILINOGEN UR STRIP-ACNC: NORMAL EU/DL (ref 0–1)
WBC #/AREA URNS HPF: ABNORMAL /HPF (ref 0–5)
WBC OTHER # BLD: 7.6 K/UL (ref 3.5–11.3)

## 2024-11-03 PROCEDURE — 96374 THER/PROPH/DIAG INJ IV PUSH: CPT | Performed by: EMERGENCY MEDICINE

## 2024-11-03 PROCEDURE — 84703 CHORIONIC GONADOTROPIN ASSAY: CPT

## 2024-11-03 PROCEDURE — 85025 COMPLETE CBC W/AUTO DIFF WBC: CPT

## 2024-11-03 PROCEDURE — 2580000003 HC RX 258

## 2024-11-03 PROCEDURE — 87480 CANDIDA DNA DIR PROBE: CPT

## 2024-11-03 PROCEDURE — 80053 COMPREHEN METABOLIC PANEL: CPT

## 2024-11-03 PROCEDURE — 87077 CULTURE AEROBIC IDENTIFY: CPT

## 2024-11-03 PROCEDURE — 87491 CHLMYD TRACH DNA AMP PROBE: CPT

## 2024-11-03 PROCEDURE — 87660 TRICHOMONAS VAGIN DIR PROBE: CPT

## 2024-11-03 PROCEDURE — 87510 GARDNER VAG DNA DIR PROBE: CPT

## 2024-11-03 PROCEDURE — 99284 EMERGENCY DEPT VISIT MOD MDM: CPT | Performed by: EMERGENCY MEDICINE

## 2024-11-03 PROCEDURE — 6370000000 HC RX 637 (ALT 250 FOR IP)

## 2024-11-03 PROCEDURE — 87186 SC STD MICRODIL/AGAR DIL: CPT

## 2024-11-03 PROCEDURE — 87086 URINE CULTURE/COLONY COUNT: CPT

## 2024-11-03 PROCEDURE — 6360000002 HC RX W HCPCS

## 2024-11-03 PROCEDURE — 81001 URINALYSIS AUTO W/SCOPE: CPT

## 2024-11-03 PROCEDURE — 87591 N.GONORRHOEAE DNA AMP PROB: CPT

## 2024-11-03 RX ORDER — CEPHALEXIN 500 MG/1
500 CAPSULE ORAL 4 TIMES DAILY
Qty: 40 CAPSULE | Refills: 0 | Status: SHIPPED | OUTPATIENT
Start: 2024-11-03 | End: 2024-11-13

## 2024-11-03 RX ORDER — PNV NO.95/FERROUS FUM/FOLIC AC 28MG-0.8MG
TABLET ORAL
Qty: 90 TABLET | Refills: 0 | Status: SHIPPED | OUTPATIENT
Start: 2024-11-03

## 2024-11-03 RX ORDER — ONDANSETRON 4 MG/1
4 TABLET, FILM COATED ORAL 3 TIMES DAILY PRN
Qty: 15 TABLET | Refills: 0 | Status: SHIPPED | OUTPATIENT
Start: 2024-11-03 | End: 2024-11-03

## 2024-11-03 RX ORDER — CEPHALEXIN 500 MG/1
500 CAPSULE ORAL ONCE
Status: COMPLETED | OUTPATIENT
Start: 2024-11-03 | End: 2024-11-03

## 2024-11-03 RX ORDER — ONDANSETRON 4 MG/1
4 TABLET, ORALLY DISINTEGRATING ORAL 3 TIMES DAILY PRN
Qty: 15 TABLET | Refills: 0 | Status: SHIPPED | OUTPATIENT
Start: 2024-11-03

## 2024-11-03 RX ORDER — ONDANSETRON 4 MG/1
4 TABLET, ORALLY DISINTEGRATING ORAL EVERY 8 HOURS PRN
Status: DISCONTINUED | OUTPATIENT
Start: 2024-11-03 | End: 2024-11-03 | Stop reason: HOSPADM

## 2024-11-03 RX ADMIN — CEPHALEXIN 500 MG: 500 CAPSULE ORAL at 14:31

## 2024-11-03 RX ADMIN — WATER 1000 MG: 1 INJECTION INTRAMUSCULAR; INTRAVENOUS; SUBCUTANEOUS at 14:32

## 2024-11-03 RX ADMIN — ONDANSETRON 4 MG: 4 TABLET, ORALLY DISINTEGRATING ORAL at 12:40

## 2024-11-03 NOTE — DISCHARGE INSTRUCTIONS
You are in the emergency department for vomiting and back pain. You labs show you have an infection in your kidney. You were prescribed antibiotic called Keflex, please take 1 capsule 4 times daily for 10 days.  Please complete entire course of antibiotics. You were also given a prescription for a medication called Zofran to help with nausea and vomiting.     Your labs showed you are also pregnant. Please take a prenatal vitamin daily. You were given information to establish care with an OB/GYN for further management of your pregnancy.  Please schedule an appointment as soon as possible.     Please return to the emergency department if you experience worsening symptoms, severe vomiting and are unable to tolerate any food or liquids, high fever that is not relieved by Tylenol, blood in your vomit or stool, severe abdominal pain or cramping, vaginal bleeding, chest pain, trouble breathing, or give any new issue or concern.

## 2024-11-03 NOTE — ED PROVIDER NOTES
[facility]     Emergency Department     Faculty Note/ Attestation      Pt Name: Tori Barrow                                       MRN: [id number]  Birthdate 1995  Date of evaluation: 11/3/2024    Patients PCP:    No primary care provider on file.    Note Started: 12:22 PM EST      Attestation  I performed a history and physical examination of the patient and discussed management with the resident. I reviewed the resident’s note and agree with the documented findings and plan of care. Any areas of disagreement are noted on the chart. I was personally present for the key portions of any procedures. I have documented in the chart those procedures where I was not present during the key portions. I have reviewed the emergency nurses triage note. I agree with the chief complaint, past medical history, past surgical history, allergies, medications, social and family history as documented unless otherwise noted below.    For Physician Assistant/ Nurse Practitioner cases/documentation I have personally evaluated this patient and have completed at least one if not all key elements of the E/M (history, physical exam, and MDM). Additional findings are as noted.      Initial Screens:        Renee Coma Scale  Eye Opening: Spontaneous  Best Verbal Response: Oriented  Best Motor Response: Obeys commands  Renee Coma Scale Score: 15    Vitals:    Vitals:    11/03/24 1143 11/03/24 1144   BP:  124/77   Pulse: 86    Resp: 18    Temp: 97.6 °F (36.4 °C)    SpO2: 97%    Weight: 90.7 kg (200 lb)    Height: 1.626 m (5' 4\")        CHIEF COMPLAINT       Chief Complaint   Patient presents with    Vomiting             DIAGNOSTIC RESULTS             RADIOLOGY:   No orders to display         LABS:  Labs Reviewed - No data to display      EMERGENCY DEPARTMENT COURSE:     -------------------------  BP: 124/77, Temp: 97.6 °F (36.4 °C), Pulse: 86, Respirations: 18      Comments    28 yo F with vomiting x2 
Pelvic exam and STD workup. Will also provide Zofran to help with vomiting and po challenge.     EKG  None    All EKG's are interpreted by the Emergency Department Physician who either signs or Co-signs this chart in the absence of a cardiologist.    EMERGENCY DEPARTMENT COURSE:      ED Course as of 11/04/24 0126   Sun Nov 03, 2024   1309 Preg, Serum(!): POSITIVE [PP]   1309 Bacteria, UA(!): MANY [PP]   1309 WBC, UA: TOO NUMEROUS TO COUNT [PP]   1309 Leukocyte Esterase, Urine(!): MODERATE [PP]   1310 Nitrite, Urine(!): POSITIVE [PP]   1310 WBC: 7.6 [PP]   1310 Hemoglobin Quant: 13.2 [PP]   1310 Platelet Count: 223 [PP]   1329 Sodium(!): 133 [PP]   1330 Potassium: 4.2 [PP]   1330 Chloride: 100 [PP]   1330 Creatinine(!): 0.5 [PP]   1330 ALT(!): 70 [PP]   1330 AST(!): 39 [PP]   1330 Alkaline Phosphatase: 69 [PP]   1401 Trichomonas: NEGATIVE [PP]   1401 GARDNERELLA VAGINALIS: NEGATIVE [PP]   1401 Candida Species: NEGATIVE [PP]   1430 Did speak with OB resident regarding if patient needs to be admitted. Since patient is afebrile, no white count, can be treated outpatient for pyelonephritis with follow up.  [PP]   1455 Patient tolerated crackers and juice without difficulty. Advised patient to schedule an appointment to follow up with OB/GYN as soon as possible, also provided information for [facility] [place]. Provided patient with prescription for antibiotics and Zofran. Return precautions were discussed with patient, all questions were answered. Patient verbalized understanding and is agreeable with plan  [PP]      ED Course User Index  [PP] Carmen Lacy MD       PROCEDURES:  None    CONSULTS:  None    CRITICAL CARE:  There was significant risk of life threatening deterioration of patient's condition requiring my direct management. Critical care time 0 minutes, excluding any documented procedures.    FINAL IMPRESSION      1. Nausea and vomiting, unspecified vomiting type    2. Acute flank pain    3. Dysuria    4.

## 2024-11-03 NOTE — ED NOTES
Pt arrived to ED 49 via triage  Pt co of not being able to keep any liquids or foods down for the past couple days.  Pt states her last menstrual cycle was 10/2/2024  Pt states she is sexually active.  Pt denies abd pain, vaginal discharge, urinary symptoms, or constipation.  Pt states she had a normal BM last night.  Pt respirations are even and unlabored, pt is alert and oriented X 4, speaking in complete sentences, bed is in the lowest position, call light is within reach, NAD noted.   Will continue to follow plan of care.

## 2024-11-03 NOTE — ED NOTES
Labeled blood specimens sent to lab via tube system.    [x] Green/yellow  [x] Lavender   [] on ice   [x] Blue   [x] Green/black [] on ice  [] Yellow  [] on ice  [] Red  [] Pink  [] Blood Cultures  [] x1 [] X2    [] Ped Green  [] on ice  [] Ped Lavender  [] on ice    [] Ped Yellow  [] on ice  [] Ped Red

## 2024-11-04 LAB
C TRACH DNA SPEC QL PROBE+SIG AMP: NEGATIVE
N GONORRHOEA DNA SPEC QL PROBE+SIG AMP: NEGATIVE
SPECIMEN DESCRIPTION: NORMAL

## 2024-11-05 LAB
MICROORGANISM SPEC CULT: ABNORMAL
SPECIMEN DESCRIPTION: ABNORMAL

## 2024-11-06 NOTE — PROGRESS NOTES
Reviewed patient's urine culture - culture positive for E. coli.  Patient was discharged on cephalexin 500 mg 4 times daily for 14 days, and culture is sensitive to prescribed medication.  Antibiotic prescribed at discharge is appropriate - no changes made to antibiotic regimen.     Nedra Lee, PharmD 11/6/2024 2:04 PM

## 2025-04-03 ENCOUNTER — HOSPITAL ENCOUNTER (OUTPATIENT)
Age: 30
Setting detail: OBSERVATION
Discharge: HOME | End: 2025-04-03
Attending: STUDENT IN AN ORGANIZED HEALTH CARE EDUCATION/TRAINING PROGRAM | Admitting: STUDENT IN AN ORGANIZED HEALTH CARE EDUCATION/TRAINING PROGRAM
Payer: MEDICAID

## 2025-04-03 ENCOUNTER — TELEPHONE (OUTPATIENT)
Dept: OBGYN | Age: 30
End: 2025-04-03

## 2025-04-03 VITALS
RESPIRATION RATE: 16 BRPM | TEMPERATURE: 98.2 F | OXYGEN SATURATION: 100 % | DIASTOLIC BLOOD PRESSURE: 86 MMHG | BODY MASS INDEX: 34.15 KG/M2 | HEART RATE: 99 BPM | SYSTOLIC BLOOD PRESSURE: 114 MMHG | HEIGHT: 64 IN | WEIGHT: 200 LBS

## 2025-04-03 DIAGNOSIS — F19.10 SUBSTANCE ABUSE: Primary | ICD-10-CM

## 2025-04-03 PROBLEM — O47.03 THREATENED PREMATURE LABOR IN THIRD TRIMESTER: Status: RESOLVED | Noted: 2025-04-03 | Resolved: 2025-04-03

## 2025-04-03 PROBLEM — F12.10 TETRAHYDROCANNABINOL (THC) USE DISORDER, MILD, ABUSE: Status: RESOLVED | Noted: 2018-07-10 | Resolved: 2025-04-03

## 2025-04-03 PROBLEM — N76.0 BACTERIAL VAGINOSIS: Status: RESOLVED | Noted: 2018-07-10 | Resolved: 2025-04-03

## 2025-04-03 PROBLEM — O24.419 DM (DIABETES MELLITUS), GESTATIONAL: Status: RESOLVED | Noted: 2018-07-10 | Resolved: 2025-04-03

## 2025-04-03 PROBLEM — B96.89 BACTERIAL VAGINOSIS: Status: RESOLVED | Noted: 2018-07-10 | Resolved: 2025-04-03

## 2025-04-03 PROBLEM — N93.9 ABNORMAL UTERINE BLEEDING: Status: ACTIVE | Noted: 2025-04-03

## 2025-04-03 PROBLEM — O47.03 THREATENED PREMATURE LABOR IN THIRD TRIMESTER: Status: ACTIVE | Noted: 2025-04-03

## 2025-04-03 PROBLEM — O09.30 LATE PRENATAL CARE: Status: RESOLVED | Noted: 2018-07-10 | Resolved: 2025-04-03

## 2025-04-03 PROBLEM — O09.93 HRP (HIGH RISK PREGNANCY), THIRD TRIMESTER: Status: RESOLVED | Noted: 2018-07-10 | Resolved: 2025-04-03

## 2025-04-03 PROBLEM — Z3A.30 30 WEEKS GESTATION OF PREGNANCY: Status: ACTIVE | Noted: 2025-04-03

## 2025-04-03 PROBLEM — N61.1 BREAST ABSCESS: Status: RESOLVED | Noted: 2017-02-20 | Resolved: 2025-04-03

## 2025-04-03 PROBLEM — Z30.017 NEXPLANON INSERTION: Status: ACTIVE | Noted: 2025-04-03

## 2025-04-03 PROBLEM — O09.92 HIGH-RISK PREGNANCY, SECOND TRIMESTER: Status: RESOLVED | Noted: 2019-08-03 | Resolved: 2025-04-03

## 2025-04-03 PROBLEM — I10 CHRONIC HYPERTENSION: Status: ACTIVE | Noted: 2025-04-03

## 2025-04-03 LAB
ABO + RH BLD: NORMAL
ALBUMIN SERPL-MCNC: 3.4 G/DL (ref 3.5–5.2)
ALBUMIN/GLOB SERPL: 1.1 {RATIO} (ref 1–2.5)
ALP SERPL-CCNC: 79 U/L (ref 35–104)
ALT SERPL-CCNC: 14 U/L (ref 10–35)
AMPHET UR QL SCN: NEGATIVE
ANION GAP SERPL CALCULATED.3IONS-SCNC: 13 MMOL/L (ref 9–16)
ARM BAND NUMBER: NORMAL
AST SERPL-CCNC: 21 U/L (ref 10–35)
BARBITURATES UR QL SCN: NEGATIVE
BASOPHILS # BLD: 0.03 K/UL (ref 0–0.2)
BASOPHILS NFR BLD: 0 % (ref 0–2)
BENZODIAZ UR QL: NEGATIVE
BILIRUB SERPL-MCNC: 0.2 MG/DL (ref 0–1.2)
BILIRUB UR QL STRIP: NEGATIVE
BLOOD BANK SAMPLE EXPIRATION: NORMAL
BLOOD GROUP ANTIBODIES SERPL: NEGATIVE
BUN SERPL-MCNC: 10 MG/DL (ref 6–20)
C TRACH DNA SPEC QL PROBE+SIG AMP: NEGATIVE
CALCIUM SERPL-MCNC: 9.1 MG/DL (ref 8.6–10.4)
CANDIDA SPECIES: POSITIVE
CANNABINOIDS UR QL SCN: POSITIVE
CHLORIDE SERPL-SCNC: 101 MMOL/L (ref 98–107)
CLARITY UR: CLEAR
CO2 SERPL-SCNC: 20 MMOL/L (ref 20–31)
COCAINE UR QL SCN: POSITIVE
COLOR UR: YELLOW
COMMENT: ABNORMAL
CREAT SERPL-MCNC: 0.5 MG/DL (ref 0.6–0.9)
CREAT UR-MCNC: 160 MG/DL (ref 28–217)
EOSINOPHIL # BLD: 0.16 K/UL (ref 0–0.44)
EOSINOPHILS RELATIVE PERCENT: 2 % (ref 1–4)
ERYTHROCYTE [DISTWIDTH] IN BLOOD BY AUTOMATED COUNT: 12.8 % (ref 11.8–14.4)
EST. AVERAGE GLUCOSE BLD GHB EST-MCNC: 117 MG/DL
FENTANYL UR QL: NEGATIVE
FERRITIN SERPL-MCNC: 15 NG/ML (ref 15–150)
GARDNERELLA VAGINALIS: POSITIVE
GFR, ESTIMATED: >90 ML/MIN/1.73M2
GLUCOSE SERPL-MCNC: 96 MG/DL (ref 74–99)
GLUCOSE UR STRIP-MCNC: NEGATIVE MG/DL
HBA1C MFR BLD: 5.7 % (ref 4–6)
HBV SURFACE AG SERPL QL IA: NONREACTIVE
HCT VFR BLD AUTO: 30.4 % (ref 36.3–47.1)
HCV AB SERPL QL IA: NONREACTIVE
HGB BLD-MCNC: 9.4 G/DL (ref 11.9–15.1)
HGB UR QL STRIP.AUTO: NEGATIVE
HIV 1+2 AB+HIV1 P24 AG SERPL QL IA: NONREACTIVE
IMM GRANULOCYTES # BLD AUTO: 0.05 K/UL (ref 0–0.3)
IMM GRANULOCYTES NFR BLD: 1 %
IRON SATN MFR SERPL: 27 % (ref 20–55)
IRON SERPL-MCNC: 116 UG/DL (ref 37–145)
KETONES UR STRIP-MCNC: ABNORMAL MG/DL
LEUKOCYTE ESTERASE UR QL STRIP: NEGATIVE
LYMPHOCYTES NFR BLD: 2.33 K/UL (ref 1.1–3.7)
LYMPHOCYTES RELATIVE PERCENT: 27 % (ref 24–43)
MCH RBC QN AUTO: 28.3 PG (ref 25.2–33.5)
MCHC RBC AUTO-ENTMCNC: 30.9 G/DL (ref 28.4–34.8)
MCV RBC AUTO: 91.6 FL (ref 82.6–102.9)
METHADONE UR QL: NEGATIVE
MONOCYTES NFR BLD: 0.53 K/UL (ref 0.1–1.2)
MONOCYTES NFR BLD: 6 % (ref 3–12)
N GONORRHOEA DNA SPEC QL PROBE+SIG AMP: NEGATIVE
NEUTROPHILS NFR BLD: 64 % (ref 36–65)
NEUTS SEG NFR BLD: 5.57 K/UL (ref 1.5–8.1)
NITRITE UR QL STRIP: NEGATIVE
NRBC BLD-RTO: 0 PER 100 WBC
OPIATES UR QL SCN: NEGATIVE
OXYCODONE UR QL SCN: NEGATIVE
PCP UR QL SCN: NEGATIVE
PH UR STRIP: 6 [PH] (ref 5–8)
PLATELET # BLD AUTO: 234 K/UL (ref 138–453)
PMV BLD AUTO: 11.3 FL (ref 8.1–13.5)
POTASSIUM SERPL-SCNC: 3.6 MMOL/L (ref 3.7–5.3)
PROT SERPL-MCNC: 6.4 G/DL (ref 6.6–8.7)
PROT UR STRIP-MCNC: NEGATIVE MG/DL
RBC # BLD AUTO: 3.32 M/UL (ref 3.95–5.11)
RUBV IGG SERPL QL IA: 40 IU/ML
SODIUM SERPL-SCNC: 134 MMOL/L (ref 136–145)
SOURCE: ABNORMAL
SP GR UR STRIP: 1.03 (ref 1–1.03)
SPECIMEN DESCRIPTION: NORMAL
T PALLIDUM AB SER QL IA: NONREACTIVE
TEST INFORMATION: ABNORMAL
TIBC SERPL-MCNC: 434 UG/DL (ref 250–450)
TOTAL PROTEIN, URINE: 14 MG/DL
TRICHOMONAS: NEGATIVE
UNSATURATED IRON BINDING CAPACITY: 318 UG/DL (ref 112–347)
URINE TOTAL PROTEIN CREATININE RATIO: 0.09 (ref 0–0.2)
UROBILINOGEN UR STRIP-ACNC: NORMAL EU/DL (ref 0–1)
WBC OTHER # BLD: 8.7 K/UL (ref 3.5–11.3)

## 2025-04-03 PROCEDURE — 6370000000 HC RX 637 (ALT 250 FOR IP)

## 2025-04-03 PROCEDURE — 87660 TRICHOMONAS VAGIN DIR PROBE: CPT

## 2025-04-03 PROCEDURE — 86901 BLOOD TYPING SEROLOGIC RH(D): CPT

## 2025-04-03 PROCEDURE — 84156 ASSAY OF PROTEIN URINE: CPT

## 2025-04-03 PROCEDURE — 87086 URINE CULTURE/COLONY COUNT: CPT

## 2025-04-03 PROCEDURE — 87340 HEPATITIS B SURFACE AG IA: CPT

## 2025-04-03 PROCEDURE — 86900 BLOOD TYPING SEROLOGIC ABO: CPT

## 2025-04-03 PROCEDURE — 80307 DRUG TEST PRSMV CHEM ANLYZR: CPT

## 2025-04-03 PROCEDURE — 86850 RBC ANTIBODY SCREEN: CPT

## 2025-04-03 PROCEDURE — 81003 URINALYSIS AUTO W/O SCOPE: CPT

## 2025-04-03 PROCEDURE — 86780 TREPONEMA PALLIDUM: CPT

## 2025-04-03 PROCEDURE — 99213 OFFICE O/P EST LOW 20 MIN: CPT | Performed by: STUDENT IN AN ORGANIZED HEALTH CARE EDUCATION/TRAINING PROGRAM

## 2025-04-03 PROCEDURE — 36415 COLL VENOUS BLD VENIPUNCTURE: CPT

## 2025-04-03 PROCEDURE — G0378 HOSPITAL OBSERVATION PER HR: HCPCS

## 2025-04-03 PROCEDURE — 83036 HEMOGLOBIN GLYCOSYLATED A1C: CPT

## 2025-04-03 PROCEDURE — 82728 ASSAY OF FERRITIN: CPT

## 2025-04-03 PROCEDURE — 86803 HEPATITIS C AB TEST: CPT

## 2025-04-03 PROCEDURE — 87491 CHLMYD TRACH DNA AMP PROBE: CPT

## 2025-04-03 PROCEDURE — 87480 CANDIDA DNA DIR PROBE: CPT

## 2025-04-03 PROCEDURE — 82570 ASSAY OF URINE CREATININE: CPT

## 2025-04-03 PROCEDURE — 87591 N.GONORRHOEAE DNA AMP PROB: CPT

## 2025-04-03 PROCEDURE — 83540 ASSAY OF IRON: CPT

## 2025-04-03 PROCEDURE — 85025 COMPLETE CBC W/AUTO DIFF WBC: CPT

## 2025-04-03 PROCEDURE — 80053 COMPREHEN METABOLIC PANEL: CPT

## 2025-04-03 PROCEDURE — 87389 HIV-1 AG W/HIV-1&-2 AB AG IA: CPT

## 2025-04-03 PROCEDURE — 86762 RUBELLA ANTIBODY: CPT

## 2025-04-03 PROCEDURE — 83550 IRON BINDING TEST: CPT

## 2025-04-03 PROCEDURE — 87510 GARDNER VAG DNA DIR PROBE: CPT

## 2025-04-03 PROCEDURE — 99221 1ST HOSP IP/OBS SF/LOW 40: CPT | Performed by: OBSTETRICS & GYNECOLOGY

## 2025-04-03 RX ORDER — FLUCONAZOLE 150 MG/1
150 TABLET ORAL ONCE
Qty: 1 TABLET | Refills: 0 | Status: SHIPPED | OUTPATIENT
Start: 2025-04-03 | End: 2025-04-03

## 2025-04-03 RX ORDER — ACETAMINOPHEN 500 MG
1000 TABLET ORAL EVERY 8 HOURS SCHEDULED
Status: DISCONTINUED | OUTPATIENT
Start: 2025-04-03 | End: 2025-04-03 | Stop reason: HOSPADM

## 2025-04-03 RX ORDER — ONDANSETRON 4 MG/1
4 TABLET, ORALLY DISINTEGRATING ORAL EVERY 8 HOURS PRN
Status: DISCONTINUED | OUTPATIENT
Start: 2025-04-03 | End: 2025-04-03 | Stop reason: HOSPADM

## 2025-04-03 RX ORDER — FERROUS SULFATE 325(65) MG
325 TABLET ORAL
Qty: 180 TABLET | Refills: 3 | Status: SHIPPED | OUTPATIENT
Start: 2025-04-03

## 2025-04-03 RX ORDER — PNV NO.95/FERROUS FUM/FOLIC AC 28MG-0.8MG
1 TABLET ORAL
Qty: 90 TABLET | Refills: 5 | Status: SHIPPED | OUTPATIENT
Start: 2025-04-03

## 2025-04-03 RX ORDER — METRONIDAZOLE 500 MG/1
500 TABLET ORAL 2 TIMES DAILY
Qty: 14 TABLET | Refills: 0 | Status: SHIPPED | OUTPATIENT
Start: 2025-04-03 | End: 2025-04-10

## 2025-04-03 RX ORDER — FLUCONAZOLE 50 MG/1
150 TABLET ORAL ONCE
Status: COMPLETED | OUTPATIENT
Start: 2025-04-03 | End: 2025-04-03

## 2025-04-03 RX ORDER — ASCORBIC ACID 500 MG
500 TABLET ORAL
Qty: 30 TABLET | Refills: 3 | Status: SHIPPED | OUTPATIENT
Start: 2025-04-03

## 2025-04-03 RX ORDER — ONDANSETRON 2 MG/ML
4 INJECTION INTRAMUSCULAR; INTRAVENOUS EVERY 6 HOURS PRN
Status: DISCONTINUED | OUTPATIENT
Start: 2025-04-03 | End: 2025-04-03 | Stop reason: HOSPADM

## 2025-04-03 RX ORDER — METRONIDAZOLE 500 MG/1
500 TABLET ORAL ONCE
Status: COMPLETED | OUTPATIENT
Start: 2025-04-03 | End: 2025-04-03

## 2025-04-03 RX ADMIN — METRONIDAZOLE 500 MG: 500 TABLET ORAL at 06:53

## 2025-04-03 RX ADMIN — ACETAMINOPHEN 1000 MG: 500 TABLET ORAL at 05:03

## 2025-04-03 RX ADMIN — FLUCONAZOLE 150 MG: 50 TABLET ORAL at 06:53

## 2025-04-03 ASSESSMENT — PAIN DESCRIPTION - ORIENTATION: ORIENTATION: LOWER

## 2025-04-03 ASSESSMENT — PAIN SCALES - GENERAL: PAINLEVEL_OUTOF10: 8

## 2025-04-03 ASSESSMENT — PAIN DESCRIPTION - LOCATION: LOCATION: ABDOMEN

## 2025-04-03 NOTE — PROGRESS NOTES
Ob Attending     Patient seen and evaluated. She reports cocaine use as does her partner Venancio. She was offered treatment, [facility] is not interested at this time in treatment. Will obtain MFM scan. Prenatal labs pending. Discussed her prior delivery with shoulder dystocia and discussed that we should make a plan for delivery discussed risks of recurrent shoulder dystocia and that it is reasonable to try for vaginal delivery in this pregnancy pending fetal weight and glycemic control. Also discussed that with history of prior shoulder dystocia could consider primary  section and risk reducing salpingectomy. She will think on these options and let us know as she comes for outpatient prenatal care. At this time she is leaning toward Nexplanon for AUB. All questions answered. Support and encouragement offered for working toward stopping cocaine use. Discussed risk of abruption, FGR and HTN disorders with continued use.     Ana Paula Mcgrath MD

## 2025-04-03 NOTE — PROGRESS NOTES
[facility] ([facility])    Sw consulted for this pt due to pt being pregnant & active substance use (cocaine). Sw met with pt in triage and discussed if pt is wanting [facility] treatment services, michael discussed [facility] and [facility] program with pt, pt stated she just wants to go home at this time. Sw also discussed [facility] and [facility], pt stated interest in both and is wanting to follow up with sw outpatient.     Pt stated she will need a cab set up for discharge, Michael spoke with Dr. Mcgrath who will notify michael once patient is ready for discharge.     Michael will also connect with [facility] OB office to set pt up with a prenatal appt.     Hermila [place], [phone]

## 2025-04-03 NOTE — TELEPHONE ENCOUNTER
This patient was seen on L&D and has had no prenatal care. She is 30 weeks pregnant and needs to be seen as soon as possible. She will also need a referral to [facility]. She has a history of substance use and would benefit from seeing Renee during her initial prenatal appt.

## 2025-04-03 NOTE — CONSULTS
MATERNAL FETAL MEDICINE CONSULT    NAME: Tori Barrow  MRN: [id number]            SERVICE DATE: April 3, 2025  SERVICE TIME: 12:54 PM  REQUESTING PROVIDER: Virgilio Danielle DO         I was asked by Virgilio Emmanuel DO to provide an inpatient consult for Tori Barrow. As I am sure you will recall, Tori Barrow is a 29 y.o. female,  at 30w3d with an SUZANNE of 2025, by Ultrasound dating method.  Patient is here with the following problems:  Active Problems:    Insufficient PNC    Anemia affecting pregnancy    Late prenatal care    Hx Gestational Thrombocytopenia (G7)    H/O gestational diabetes in prior pregnancy, currently pregnant    Anxiety    High risk pregnancy, antepartum    cHTN (no meds)    Substance abuse (Cocaine, THC)    30 weeks gestation of pregnancy    Desires Nexplanon after delivery    AUB  Resolved Problems:    Threatened premature labor in third trimester      SUBJECTIVE:  HISTORY OF THE PRESENT ILLNESS:  HISTORY REVIEW  Past Medical History:   Diagnosis Date    Anemia     Gestational diabetes     Hypertension      No past surgical history on file.  No family history on file.  Social History     Socioeconomic History    Marital status: Single     Spouse name: Not on file    Number of children: Not on file    Years of education: Not on file    Highest education level: Not on file   Occupational History    Not on file   Tobacco Use    Smoking status: Former     Current packs/day: 0.00     Types: Cigarettes    Smokeless tobacco: Never    Tobacco comments:     states that shes a former smoker   Vaping Use    Vaping status: Never Used   Substance and Sexual Activity    Alcohol use: No    Drug use: No    Sexual activity: Yes     Partners: Male   Other Topics Concern    Not on file   Social History Narrative    Not on file     Social Drivers of Health     Financial Resource Strain: Medium Risk (10/18/2019)    Received from [place]    Overall

## 2025-04-03 NOTE — PROGRESS NOTES
[facility] ([facility])     Sw notified by RN that pt is ready for discharge. Sw set up cab for discharge through pts insurance. Fab will notify RN.    Cab confirmation code #[id number]    Hermila Montana, [phone]   [facility].

## 2025-04-03 NOTE — FLOWSHEET NOTE
Patient to L&D with c/o lower abdominal pain that started earlier the previous day. Patient denies contractions, LOF or vaginal bleeding. +FM

## 2025-04-03 NOTE — TELEPHONE ENCOUNTER
Spoke with ELBERT Cleaning, regarding patient.  She expressed interest in doing [facility].  She was added onto the schedule for next Thursday.  She does not have a working phone at this time.  Hermila will inform her of this appointment.

## 2025-04-03 NOTE — FLOWSHEET NOTE
Discharge Paperwork discussed with pt. And FOB. Pt. Verbalizes understanding with no questions. Pt. Is waiting for Cab from Social Work. Pt. Will be discharge home upon arrival of cab.

## 2025-04-03 NOTE — PROGRESS NOTES
CLINICAL PHARMACY NOTE: MEDS TO BEDS    Total # of Prescriptions Filled: 5   The following medications were delivered to the patient:  Vit c   Prenatal  Ferosul  Metronidazole  fluconazole    Additional Documentation: Picked up at pharmacy.

## 2025-04-03 NOTE — DISCHARGE INSTRUCTIONS
Please present to your scheduled appointment at [facility] clinic on THURSDAY, APRIL 10TH at

## 2025-04-03 NOTE — PROGRESS NOTES
Obstetric/Gynecology Resident Interval Note    Patient returned from [facility] ultrasound.  She is resting comfortably without complaint.  Denies obstetric complaints at this time.  She is stable for discharge from an obstetric standpoint. She again states she would not like to go to inpatient rehabilitation at [facility].     She has received official dating, and all prenatal laboratory workup.  No further need for any intervention at this time.  She has been provided with prescriptions for prenatal vitamins, iron supplementation, and aspirin.    She is scheduled for initial prenatal appointment at [facility] on 4/10/25 and aware.     Patient may be discharged to home. Patient counseled on  labor precautions, preE precautions, adequate PO hydration, and fetal kick counts. All questions answered and concerns addressed. Patient vocalized understanding.     Attending updated and in agreement with plan.    Clemente Patterson MD  OB/GYN Resident, PGY3  [place], [place]  4/3/2025, 11:51 AM

## 2025-04-03 NOTE — H&P
OBSTETRICAL HISTORY AND PHYSICAL  [facility]    Date: 4/3/2025       Time: 6:02 AM   Patient Name: Tori Barrow     Patient : 1995  [facility]    Admission Date/Time: 4/3/2025  3:35 AM      CC: Abdominal Pain     HPI: Tori Barrow is a 29 y.o.  at Unknown gestation and no prenatal care who presents to Labor and Delivery with abdominal pain. Patient reports abdominal pain started several hours prior to arrival. She describes cramping and sharp pains in her lower stomach with associated tightening. She also endorses middle lower back pain. She has noticed a change in discharge in the last few days with moderately thick white milky appearing discharge. She also endorses urinary frequency.   Patient denies any fever, chills, N/V, headaches, vision changes, chest pain, shortness of breath, RUQ pain, and increased swelling/tenderness in bilateral lower extremities. The patient reports fetal movement is present, complains of contractions, denies loss of fluid, denies vaginal bleeding.    DATING:  LMP: No LMP recorded (lmp unknown). Patient is pregnant.  Estimated Date of Delivery: None noted.   Based on: ultrasound completed in triage today, at 30 1/7 weeks GA    PREGNANCY RISK FACTORS:  Patient Active Problem List   Diagnosis    Breast abscess    25 weeks gestation of pregnancy    Abdominal pain in pregnancy, second trimester    Insufficient PNC    Desires Tubal    Anemia affecting pregnancy    Tetrahydrocannabinol (THC) use disorder, mild, abuse    DM (diabetes mellitus), gestational    Obesity    Tobacco use    Late prenatal care    Gestational thrombocytopenia    H/O gestational diabetes in prior pregnancy, currently pregnant    Anxiety    Bacterial vaginosis     contractions    High risk pregnancy, antepartum    15 weeks gestation of pregnancy    Syncope    High-risk pregnancy, second trimester    cHTN (no meds)    Substance abuse (Cocaine, THC)

## 2025-04-03 NOTE — CARE COORDINATION
ANTEPARTUM NOTE    High-risk pregnancy, unspecified trimester [O09.90]    Tori was admitted to L&D on 4/3/2025 for abdominal pain @ estimated 30 1/7    OB GYN Provider: none    Will meet with patient to verify name/address/phone/insurance and discuss discharge planning.     Anticipate DC home once hemodynamically stable and fetal status is reassuring.

## 2025-04-04 LAB
MICROORGANISM SPEC CULT: NORMAL
SERVICE CMNT-IMP: NORMAL
SPECIMEN DESCRIPTION: NORMAL

## 2025-04-14 ENCOUNTER — TELEPHONE (OUTPATIENT)
Dept: OBGYN | Age: 30
End: 2025-04-14

## 2025-04-14 NOTE — TELEPHONE ENCOUNTER
Tried calling patient regarding missed appointment/  establish with the [facility].  Patient does not have a working phone number or mychart.  Letter mailed

## 2025-04-15 ENCOUNTER — RESULTS FOLLOW-UP (OUTPATIENT)
Dept: OBGYN | Age: 30
End: 2025-04-15